# Patient Record
Sex: MALE | Race: WHITE | NOT HISPANIC OR LATINO | Employment: OTHER | URBAN - METROPOLITAN AREA
[De-identification: names, ages, dates, MRNs, and addresses within clinical notes are randomized per-mention and may not be internally consistent; named-entity substitution may affect disease eponyms.]

---

## 2017-01-12 ENCOUNTER — ALLSCRIPTS OFFICE VISIT (OUTPATIENT)
Dept: OTHER | Facility: OTHER | Age: 77
End: 2017-01-12

## 2018-01-12 VITALS
SYSTOLIC BLOOD PRESSURE: 120 MMHG | OXYGEN SATURATION: 98 % | DIASTOLIC BLOOD PRESSURE: 75 MMHG | BODY MASS INDEX: 27.32 KG/M2 | WEIGHT: 185 LBS | TEMPERATURE: 95.7 F | HEART RATE: 60 BPM

## 2018-01-17 NOTE — RESULT NOTES
Verified Results  (1) TISSUE EXAM [id number] 01:24PM Heladio Landry     Test Name Result Flag Reference   LAB AP CASE REPORT (Report)     Surgical Pathology Report             Case: [id number]                   Authorizing Provider: Juan Manuel Unger MD      Collected:      12/13/2016 1324        Ordering Location:   [address] [place] [address]   Received:      12/14/2016 [address]                               Pathologist:      Owen Pinedo MD                               Specimen:  Stomach, Gastric Ulcer - Body   LAB AP FINAL DIAGNOSIS (Report)     Stomach, body, biopsy:   - Mild chronic inactive gastritis with focal surface erosion   - Warthin-Starry stain is negative for H pylori organisms   - Alcian blue stain is negative for intestinal metaplasia   - No dysplasia or malignancy identified    Electronically signed by Owen Pinedo MD on 12/16/2016 at 10:46 AM   LAB AP SURGICAL ADDITIONAL INFORMATION (Report)     These tests were developed and their performance characteristics   determined by [facility] or [facility]  They may not be cleared or approved by the [place]  Food and   Drug Administration  The FDA has determined that such clearance or   approval is not necessary  These tests are used for clinical purposes  They should not be regarded as investigational or for research  This   laboratory has been approved by [place] 88, designated as a high-complexity   laboratory and is qualified to perform these tests  Interpretation performed at [facility]   LAB AP GROSS DESCRIPTION (Report)     A  The specimen is received in formalin, labeled with the patient's name   and hospital number, and is designated gastric ulcer body  The specimen   consists of a single tan soft tissue fragment measuring 0 6 cm  Entirely   submitted  One cassette      Note: The estimated total formalin fixation time based upon information   provided by the submitting clinician and the standard processing schedule   is 24 5 hours      MAC   LAB AP CLINICAL INFORMATION Cold biopsy     Cold biopsy

## 2018-02-20 RX ORDER — LANSOPRAZOLE 30 MG/1
CAPSULE, DELAYED RELEASE ORAL
Qty: 90 CAPSULE | Refills: 0 | OUTPATIENT
Start: 2018-02-20

## 2018-02-21 ENCOUNTER — TELEPHONE (OUTPATIENT)
Dept: GASTROENTEROLOGY | Facility: CLINIC | Age: 78
End: 2018-02-21

## 2018-02-21 DIAGNOSIS — K21.9 GASTROESOPHAGEAL REFLUX DISEASE, ESOPHAGITIS PRESENCE NOT SPECIFIED: Primary | ICD-10-CM

## 2018-02-21 RX ORDER — LANSOPRAZOLE 30 MG/1
30 CAPSULE, DELAYED RELEASE ORAL 2 TIMES DAILY
Qty: 180 CAPSULE | Refills: 2 | Status: SHIPPED | OUTPATIENT
Start: 2018-02-21 | End: 2019-01-14 | Stop reason: SDUPTHER

## 2018-02-21 NOTE — TELEPHONE ENCOUNTER
Dr Mir Grady requesting refill on lansoprazole [facility] mg [facility]--he has office appt 3-28-18 and only has 1 week supply left

## 2018-03-28 ENCOUNTER — OFFICE VISIT (OUTPATIENT)
Dept: GASTROENTEROLOGY | Facility: CLINIC | Age: 78
End: 2018-03-28
Payer: COMMERCIAL

## 2018-03-28 VITALS
HEART RATE: 56 BPM | WEIGHT: 188.2 LBS | BODY MASS INDEX: 27.88 KG/M2 | SYSTOLIC BLOOD PRESSURE: 127 MMHG | TEMPERATURE: 97.1 F | HEIGHT: 69 IN | DIASTOLIC BLOOD PRESSURE: 65 MMHG

## 2018-03-28 DIAGNOSIS — K22.4 ESOPHAGEAL DYSMOTILITY: Primary | ICD-10-CM

## 2018-03-28 PROCEDURE — 99214 OFFICE O/P EST MOD 30 MIN: CPT | Performed by: INTERNAL MEDICINE

## 2018-03-28 NOTE — PROGRESS NOTES
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  Dilation was performed using Savary dilator to 54 Fr  No mucosal tear noted after dialation  He has been doing well since his dilation and has had only three episodes of difficulty swallowing  He is currently taking Lansoprazole but has been getting this filled with his PCP  I recommend he continue this medication at current dosage  Chew your food well  2  GERD -reflux symptoms are well controlled  Continue lansoprazole  3  Colon cancer screening  Per the patient, his last colonoscopy was performed within the past three years, at that time did not have any polyps  I will have our office obtain this record for our review  Follow up in one year    ______________________________________________________________________    SUBJECTIVE:  Phyllis Rodrigues  is a 65 y o  male with history of GERD, and prior esophageal stricture  When we last saw him in November of 2016 he was experiencing dysphagia  He had EGD in December of 2016 which showed normal esophagus  Gastritis found  Superficial ulcer found in the body of the stomach  The dysphagia he was having was 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  Dilation was performed  No mucosal tear noted after dialation  Since his endoscopy he has been doing well  He has had approximately three episodes of difficulty swallowing since his dilation  He has been chewing his food well and taking small bites  He is currently taking Lansoprazole but is needing refill of this medication  His last colonoscopy was performed within the past three years  At that time did not have any polyps  REVIEW OF SYSTEMS IS OTHERWISE NEGATIVE  Historical Information   Past Medical History:   Diagnosis Date    Dysphagia     Irregular heart beat     afib 2012     Past Surgical History:   Procedure Laterality Date    APPENDECTOMY      CARDIOVERSION      JOINT REPLACEMENT      right knee    OR ESOPHAGOGASTRODUODENOSCOPY TRANSORAL DIAGNOSTIC N/A 12/13/2016    Procedure: ESOPHAGOGASTRODUODENOSCOPY (EGD); Surgeon: Dave Reveles MD;  Location:  GI LAB; Service: Gastroenterology     Social History   History   Alcohol Use No     History   Drug Use No     History   Smoking Status    Never Smoker   Smokeless Tobacco    Never Used     History reviewed  No pertinent family history  Meds/Allergies       Current Outpatient Prescriptions:     aspirin 81 MG tablet    Calcium Carbonate-Vitamin D (CALTRATE 600+D PO)    flecainide (TAMBOCOR) 100 mg tablet    lansoprazole (PREVACID) 30 mg capsule    metoprolol succinate (TOPROL-XL) 25 mg 24 hr tablet    Multiple Vitamins-Minerals (OCUVITE PO)    multivitamin (THERAGRAN) TABS    Allergies   Allergen Reactions    Celecoxib Other (See Comments)    Dabigatran     Rivaroxaban     Rofecoxib Other (See Comments)    Sotalol      Other reaction(s): decreased blood pressure           Objective     Blood pressure 127/65, pulse 56, temperature (!) 97 1 °F (36 2 °C), temperature source Tympanic, height 5' 9" (1 753 m), weight 85 4 kg (188 lb 3 2 oz)  PHYSICAL EXAM:      General Appearance:   Alert, cooperative, no distress   HEENT:   Normocephalic, atraumatic, anicteric      Neck:  Supple, symmetrical, trachea midline   Lungs:   Clear to auscultation bilaterally; no rales, rhonchi or wheezing; respirations unlabored    Heart[de-identified]   Regular rate and rhythm; no murmur, rub, or gallop     Abdomen:   Soft, non-tender, non-distended; normal bowel sounds; no masses, no organomegaly    Genitalia:   Deferred    Rectal:   Deferred    Extremities:  No cyanosis, clubbing or edema    Pulses:  2+ and symmetric    Skin:  No jaundice, rashes, or lesions    Lymph nodes:  No palpable cervical lymphadenopathy        Lab Results:   No visits with results within 1 Day(s) from this visit  Latest known visit with results is:   Admission on 12/13/2016, Discharged on 12/13/2016   Component Date Value    Case Report 12/13/2016                      Value:Surgical Pathology Report                         Case: [id number]                                   Authorizing Provider:  Neftali Israel MD           Collected:           12/13/2016 1324              Ordering Location:     [address] [place] [address]      Received:            12/14/2016 [facility]                                                           Pathologist:           Isaac Oconnor MD                                                           Specimen:    Stomach, Gastric Ulcer - Body                                                              Final Diagnosis 12/13/2016                      Value: This result contains rich text formatting which cannot be displayed here   Additional Information 12/13/2016                      Value: This result contains rich text formatting which cannot be displayed here  Smiley Watters Gross Description 12/13/2016                      Value: This result contains rich text formatting which cannot be displayed here   Clinical Information 12/13/2016                      Value:Cold biopsy         Radiology Results:   No results found  Attestation:   By signing my name below, IKeaton, attest that this documentation has been prepared under the direction and in the presence of Neftali Israel MD  Electronically Signed: Fadumo Escobedo  3/28/2018    Neftali GOULD, personally performed the services described in this documentation   All medical record entries made by the scribe were at my direction and in my presence  I have reviewed the chart and discharge instructions and agree that the record reflects my personal performance and is accurate and complete   Giovana Lynn MD   3/28/2018

## 2018-03-28 NOTE — LETTER
March 28, 2018     Garland Flores, [address] [place] [address], [address] [address]    Patient: Cesar Silver  YOB: 1940   Date of Visit: 3/28/2018       Dear Dr Petar Wright: Thank you for referring Carole Yan to me for evaluation  Below are my notes for this consultation  If you have questions, please do not hesitate to call me  I look forward to following your patient along with you           Sincerely,        Nathalia Gore MD        CC: No Recipients

## 2018-03-28 NOTE — LETTER
March 28, 2018     Srinath Vargas, [address] [place] [address] Drive, [address] [place] [address]    Patient: Linda Carr  YOB: 1940   Date of Visit: 3/28/2018       Dear Dr Max Cortez: Thank you for referring Kathy Crawford to me for evaluation  Below are my notes for this consultation  If you have questions, please do not hesitate to call me  I look forward to following your patient along with you  Sincerely,        Kylie Alexander MD        CC: No Recipients  Kylie Alexander MD  3/28/2018 12:21 PM  Sign at close encounter  [facility] - Outpatient Follow-up Note  Linda Carr  65 y o  male MRN: [id number]  Encounter: [id number]          ASSESSMENT AND PLAN:        1  Dysphagia- He has  history of esophageal stricture and difficulty swallowing  He had EGD in December of 2016 which showed normal esophagus  Gastritis found  Superficial ulcer found in the body of the stomach  The dysphagia he was having was 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  Dilation was performed using Qnekt dilator to 54 Fr  No mucosal tear noted after dialation  He has been doing well since his dilation and has had only three episodes of difficulty swallowing  He is currently taking Lansoprazole but has been getting this filled with his PCP  I recommend he continue this medication at current dosage  Chew your food well  2  GERD -reflux symptoms are well controlled  Continue lansoprazole  3  Colon cancer screening  Per the patient, his last colonoscopy was performed within the past three years, at that time did not have any polyps  I will have our office obtain this record for our review  Recommend follow up colonoscopy in seven years  Follow up in one year    ______________________________________________________________________    SUBJECTIVE:  Linda Carr  is a 65 y o  male with history of GERD, and prior esophageal stricture   When we last saw him in November of 2016 he was experiencing dysphagia  He had EGD in December of 2016 which showed normal esophagus  Gastritis found  Superficial ulcer found in the body of the stomach  The dysphagia he was having was 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  Dilation was performed  No mucosal tear noted after dialation  Since his endoscopy he has been doing well  He has had approximately three episodes of difficulty swallowing since his dilation  He has been chewing his food well and taking small bites  He is currently taking Lansoprazole but is needing refill of this medication  His last colonoscopy was performed within the past three years  At that time did not have any polyps  REVIEW OF SYSTEMS IS OTHERWISE NEGATIVE  Historical Information   Past Medical History:   Diagnosis Date    Dysphagia     Irregular heart beat     afib 2012     Past Surgical History:   Procedure Laterality Date    APPENDECTOMY      CARDIOVERSION      JOINT REPLACEMENT      right knee    NC ESOPHAGOGASTRODUODENOSCOPY TRANSORAL DIAGNOSTIC N/A 12/13/2016    Procedure: ESOPHAGOGASTRODUODENOSCOPY (EGD); Surgeon: Edinson Ochoa MD;  Location: [facility]; Service: Gastroenterology     Social History   History   Alcohol Use No     History   Drug Use No     History   Smoking Status    Never Smoker   Smokeless Tobacco    Never Used     History reviewed  No pertinent family history      Meds/Allergies       Current Outpatient Prescriptions:     aspirin 81 MG tablet    Calcium Carbonate-Vitamin D (CALTRATE 600+D PO)    flecainide (TAMBOCOR) 100 mg tablet    lansoprazole (PREVACID) 30 mg capsule    metoprolol succinate (TOPROL-XL) 25 mg 24 hr tablet    Multiple Vitamins-Minerals (OCUVITE PO)    multivitamin (THERAGRAN) TABS    Allergies   Allergen Reactions    Celecoxib Other (See Comments)    Dabigatran     Rivaroxaban     Rofecoxib Other (See Comments)    Sotalol      Other reaction(s): decreased blood pressure           Objective     Blood pressure 127/65, pulse 56, temperature (!) 97 1 °F (36 2 °C), temperature source Tympanic, height 5' 9" (1 753 m), weight 85 4 kg (188 lb 3 2 oz)  PHYSICAL EXAM:      General Appearance:   Alert, cooperative, no distress   HEENT:   Normocephalic, atraumatic, anicteric      Neck:  Supple, symmetrical, trachea midline   Lungs:   Clear to auscultation bilaterally; no rales, rhonchi or wheezing; respirations unlabored    Heart[de-identified]   Regular rate and rhythm; no murmur, rub, or gallop  Abdomen:   Soft, non-tender, non-distended; normal bowel sounds; no masses, no organomegaly    Genitalia:   Deferred    Rectal:   Deferred    Extremities:  No cyanosis, clubbing or edema    Pulses:  2+ and symmetric    Skin:  No jaundice, rashes, or lesions    Lymph nodes:  No palpable cervical lymphadenopathy        Lab Results:   No visits with results within 1 Day(s) from this visit  Latest known visit with results is:   Admission on 12/13/2016, Discharged on 12/13/2016   Component Date Value    Case Report 12/13/2016                      Value:Surgical Pathology Report                         Case: [id number]                                   Authorizing Provider:  Diane Durham MD           Collected:           12/13/2016 1324              Ordering Location:     [facility]      Received:            12/14/2016 [address] [place] [address] Endoscopy                                                           Pathologist:           Norma Gutierres MD                                                           Specimen:    Stomach, Gastric Ulcer - Body                                                              Final Diagnosis 12/13/2016                      Value: This result contains rich text formatting which cannot be displayed here   Additional Information 12/13/2016                      Value: This result contains rich text formatting which cannot be displayed here  aCrson Ley Gross Description 12/13/2016                      Value: This result contains rich text formatting which cannot be displayed here   Clinical Information 12/13/2016                      Value:Cold biopsy         Radiology Results:   No results found  Attestation:   By signing my name below, I, [facility], attest that this documentation has been prepared under the direction and in the presence of Eddie Mascorro MD  Electronically Signed: [facility]  3/28/2018    Eddie GOULD, personally performed the services described in this documentation  All medical record entries made by the scribe were at my direction and in my presence  I have reviewed the chart and discharge instructions and agree that the record reflects my personal performance and is accurate and complete   Eddie Mascorro MD   3/28/2018

## 2019-01-14 ENCOUNTER — TELEPHONE (OUTPATIENT)
Dept: GASTROENTEROLOGY | Facility: CLINIC | Age: 79
End: 2019-01-14

## 2019-01-14 DIAGNOSIS — K21.9 GASTROESOPHAGEAL REFLUX DISEASE, ESOPHAGITIS PRESENCE NOT SPECIFIED: ICD-10-CM

## 2019-01-14 RX ORDER — LANSOPRAZOLE 30 MG/1
30 CAPSULE, DELAYED RELEASE ORAL 2 TIMES DAILY
Qty: 180 CAPSULE | Refills: 0 | Status: SHIPPED | OUTPATIENT
Start: 2019-01-14 | End: 2020-08-26 | Stop reason: SDUPTHER

## 2020-04-01 ENCOUNTER — TELEPHONE (OUTPATIENT)
Dept: FAMILY MEDICINE CLINIC | Facility: CLINIC | Age: 80
End: 2020-04-01

## 2020-04-02 ENCOUNTER — TELEPHONE (OUTPATIENT)
Dept: FAMILY MEDICINE CLINIC | Facility: CLINIC | Age: 80
End: 2020-04-02

## 2020-04-02 DIAGNOSIS — R53.83 FATIGUE, UNSPECIFIED TYPE: Primary | ICD-10-CM

## 2020-04-02 DIAGNOSIS — M79.10 MYALGIA: ICD-10-CM

## 2020-04-05 DIAGNOSIS — R53.83 OTHER FATIGUE: Primary | ICD-10-CM

## 2020-04-05 LAB
ALBUMIN SERPL-MCNC: 3.8 G/DL (ref 3.6–5.1)
ALBUMIN/GLOB SERPL: 2.1 (CALC) (ref 1–2.5)
ALP SERPL-CCNC: 83 U/L (ref 35–144)
ALT SERPL-CCNC: 43 U/L (ref 9–46)
AST SERPL-CCNC: 45 U/L (ref 10–35)
BASOPHILS # BLD AUTO: 29 CELLS/UL (ref 0–200)
BASOPHILS NFR BLD AUTO: 0.7 %
BILIRUB SERPL-MCNC: 0.7 MG/DL (ref 0.2–1.2)
BUN SERPL-MCNC: 14 MG/DL (ref 7–25)
BUN/CREAT SERPL: ABNORMAL (CALC) (ref 6–22)
CALCIUM SERPL-MCNC: 8.5 MG/DL (ref 8.6–10.3)
CHLORIDE SERPL-SCNC: 102 MMOL/L (ref 98–110)
CK MB CFR SERPL ELPH: 1.1 NG/ML (ref 0–5)
CK SERPL-CCNC: 52 U/L (ref 44–196)
CO2 SERPL-SCNC: 28 MMOL/L (ref 20–32)
CREAT SERPL-MCNC: 1.02 MG/DL (ref 0.7–1.11)
EOSINOPHIL # BLD AUTO: 21 CELLS/UL (ref 15–500)
EOSINOPHIL NFR BLD AUTO: 0.5 %
ERYTHROCYTE [DISTWIDTH] IN BLOOD BY AUTOMATED COUNT: 13.7 % (ref 11–15)
ERYTHROCYTE [SEDIMENTATION RATE] IN BLOOD BY WESTERGREN METHOD: 2 MM/H
GLOBULIN SER CALC-MCNC: 1.8 G/DL (CALC) (ref 1.9–3.7)
GLUCOSE SERPL-MCNC: 102 MG/DL (ref 65–99)
HCT VFR BLD AUTO: 49.8 % (ref 38.5–50)
HGB BLD-MCNC: 17.1 G/DL (ref 13.2–17.1)
LYMPHOCYTES # BLD AUTO: 848 CELLS/UL (ref 850–3900)
LYMPHOCYTES NFR BLD AUTO: 20.2 %
MCH RBC QN AUTO: 30 PG (ref 27–33)
MCHC RBC AUTO-ENTMCNC: 34.3 G/DL (ref 32–36)
MCV RBC AUTO: 87.4 FL (ref 80–100)
MONOCYTES # BLD AUTO: 374 CELLS/UL (ref 200–950)
MONOCYTES NFR BLD AUTO: 8.9 %
NEUTROPHILS # BLD AUTO: 2927 CELLS/UL (ref 1500–7800)
NEUTROPHILS NFR BLD AUTO: 69.7 %
PLATELET # BLD AUTO: 172 THOUSAND/UL (ref 140–400)
PMV BLD REES-ECKER: 11.9 FL (ref 7.5–12.5)
POTASSIUM SERPL-SCNC: 4.1 MMOL/L (ref 3.5–5.3)
PROT SERPL-MCNC: 5.6 G/DL (ref 6.1–8.1)
RBC # BLD AUTO: 5.7 MILLION/UL (ref 4.2–5.8)
SL AMB EGFR AFRICAN AMERICAN: 80 ML/MIN/1.73M2
SL AMB EGFR NON AFRICAN AMERICAN: 69 ML/MIN/1.73M2
SODIUM SERPL-SCNC: 138 MMOL/L (ref 135–146)
WBC # BLD AUTO: 4.2 THOUSAND/UL (ref 3.8–10.8)

## 2020-04-05 RX ORDER — LEVOFLOXACIN 500 MG/1
500 TABLET, FILM COATED ORAL EVERY 24 HOURS
Qty: 7 TABLET | Refills: 0 | Status: SHIPPED | OUTPATIENT
Start: 2020-04-05 | End: 2020-04-12

## 2020-04-06 ENCOUNTER — TELEPHONE (OUTPATIENT)
Dept: FAMILY MEDICINE CLINIC | Facility: CLINIC | Age: 80
End: 2020-04-06

## 2020-04-06 DIAGNOSIS — Z20.828 EXPOSURE TO SARS-ASSOCIATED CORONAVIRUS: Primary | ICD-10-CM

## 2020-04-06 PROCEDURE — 87635 SARS-COV-2 COVID-19 AMP PRB: CPT | Performed by: FAMILY MEDICINE

## 2020-04-07 ENCOUNTER — TELEPHONE (OUTPATIENT)
Dept: FAMILY MEDICINE CLINIC | Facility: CLINIC | Age: 80
End: 2020-04-07

## 2020-04-07 ENCOUNTER — TELEMEDICINE (OUTPATIENT)
Dept: FAMILY MEDICINE CLINIC | Facility: CLINIC | Age: 80
End: 2020-04-07
Payer: COMMERCIAL

## 2020-04-07 VITALS — HEIGHT: 69 IN | WEIGHT: 180 LBS | BODY MASS INDEX: 26.66 KG/M2

## 2020-04-07 DIAGNOSIS — U07.1 COVID-19 VIRUS INFECTION: Primary | ICD-10-CM

## 2020-04-07 PROBLEM — I48.91 ATRIAL FIBRILLATION (HCC): Status: ACTIVE | Noted: 2020-04-07

## 2020-04-07 PROBLEM — I42.0 DILATED CARDIOMYOPATHY (HCC): Status: ACTIVE | Noted: 2020-04-07

## 2020-04-07 LAB — SARS-COV-2 RNA SPEC QL NAA+PROBE: DETECTED

## 2020-04-07 PROCEDURE — G2012 BRIEF CHECK IN BY MD/QHP: HCPCS | Performed by: FAMILY MEDICINE

## 2020-08-26 DIAGNOSIS — K21.9 GASTROESOPHAGEAL REFLUX DISEASE, ESOPHAGITIS PRESENCE NOT SPECIFIED: ICD-10-CM

## 2020-08-26 RX ORDER — LANSOPRAZOLE 30 MG/1
30 CAPSULE, DELAYED RELEASE ORAL DAILY
Qty: 90 CAPSULE | Refills: 1 | Status: SHIPPED | OUTPATIENT
Start: 2020-08-26 | End: 2021-04-07 | Stop reason: SDUPTHER

## 2020-08-26 NOTE — TELEPHONE ENCOUNTER
Needs a refill for:  Lansoprazole 30mg, 1 tablet 2x's a day; (90 day supply)    [address]    Patient ph # [phone]

## 2020-11-30 ENCOUNTER — TELEPHONE (OUTPATIENT)
Dept: FAMILY MEDICINE CLINIC | Facility: CLINIC | Age: 80
End: 2020-11-30

## 2021-01-05 ENCOUNTER — OFFICE VISIT (OUTPATIENT)
Dept: INTERNAL MEDICINE CLINIC | Facility: CLINIC | Age: 81
End: 2021-01-05
Payer: COMMERCIAL

## 2021-01-05 VITALS
BODY MASS INDEX: 26.39 KG/M2 | OXYGEN SATURATION: 98 % | HEIGHT: 69 IN | TEMPERATURE: 97.8 F | DIASTOLIC BLOOD PRESSURE: 72 MMHG | SYSTOLIC BLOOD PRESSURE: 138 MMHG | HEART RATE: 59 BPM | WEIGHT: 178.2 LBS

## 2021-01-05 DIAGNOSIS — I48.0 PAROXYSMAL ATRIAL FIBRILLATION (HCC): Primary | ICD-10-CM

## 2021-01-05 DIAGNOSIS — I42.0 DILATED CARDIOMYOPATHY (HCC): ICD-10-CM

## 2021-01-05 DIAGNOSIS — Z00.00 MEDICARE ANNUAL WELLNESS VISIT, SUBSEQUENT: ICD-10-CM

## 2021-01-05 DIAGNOSIS — Z12.5 SCREENING PSA (PROSTATE SPECIFIC ANTIGEN): ICD-10-CM

## 2021-01-05 DIAGNOSIS — I34.0 NONRHEUMATIC MITRAL VALVE REGURGITATION: ICD-10-CM

## 2021-01-05 DIAGNOSIS — K21.9 GASTROESOPHAGEAL REFLUX DISEASE, UNSPECIFIED WHETHER ESOPHAGITIS PRESENT: ICD-10-CM

## 2021-01-05 PROBLEM — U07.1 COVID-19 VIRUS INFECTION: Status: RESOLVED | Noted: 2020-04-07 | Resolved: 2021-01-05

## 2021-01-05 PROCEDURE — 99204 OFFICE O/P NEW MOD 45 MIN: CPT | Performed by: INTERNAL MEDICINE

## 2021-01-05 NOTE — PROGRESS NOTES
Assessment/Plan:    Gastroesophageal reflux disease  H/o esophageal stricture and dilatation  Cont Prevacid    Atrial fibrillation (HCC)  F/U with cardiology as scheduled  On metoprolol flecainide Eliquis    Dilated cardiomyopathy ([facility] [address] )  Euvolemic, no diuretics  EF 55-60%    Nonrheumatic mitral valve regurgitation  Surgery to be planned         Problem List Items Addressed This Visit        Digestive    Gastroesophageal reflux disease     H/o esophageal stricture and dilatation  Cont Prevacid            Cardiovascular and Mediastinum    Nonrheumatic mitral valve regurgitation     Surgery to be planned         Atrial fibrillation ([facility] [address] ) - Primary     F/U with cardiology as scheduled  On metoprolol flecainide Eliquis         Relevant Orders    CBC and differential    Comprehensive metabolic panel    Lipid Panel with Direct LDL reflex    Dilated cardiomyopathy ([facility] [address] )     Euvolemic, no diuretics  EF 55-60%         Relevant Orders    CBC and differential    Comprehensive metabolic panel    Lipid Panel with Direct LDL reflex      Other Visit Diagnoses     Screening PSA (prostate specific antigen)        Relevant Orders    PSA, Total Screen    Medicare annual wellness visit, subsequent                Subjective:      Patient ID: Chanda Frey  is a 79 y o  male      HPI  Here to establish care  Daughter comes to this practice  Afib in  and was started on flecainide then  In Sept, he had rapid afib and was cardioverted and placed on Eliquis  Also found with severe MR but no surgery planned a this time  Another episode lat week and cardioverted again  Wife  in July for advanced ovarian cancer    The following portions of the patient's history were reviewed and updated as appropriate: allergies, current medications, past family history, past medical history, past social history, past surgical history and problem list     Review of Systems   Constitutional: Negative for chills, fatigue, fever and unexpected weight change  HENT: Negative for congestion, rhinorrhea and sore throat  Respiratory: Negative for cough, shortness of breath and wheezing  Cardiovascular: Negative for chest pain, palpitations and leg swelling  Gastrointestinal: Negative for abdominal pain, constipation, diarrhea, nausea and vomiting  Genitourinary: Negative for difficulty urinating  Musculoskeletal: Negative for arthralgias and myalgias  Neurological: Negative for dizziness and headaches  Psychiatric/Behavioral: Positive for dysphoric mood  Objective:      /72   Pulse 59   Temp 97 8 °F (36 6 °C)   Ht 5' 9" (1 753 m)   Wt 80 8 kg (178 lb 3 2 oz)   SpO2 98%   BMI 26 32 kg/m²          Physical Exam  Constitutional:       General: He is not in acute distress  Appearance: Normal appearance  He is well-developed  He is not ill-appearing, toxic-appearing or diaphoretic  HENT:      Head: Normocephalic and atraumatic  Right Ear: External ear normal  There is no impacted cerumen  Left Ear: External ear normal  There is no impacted cerumen  Eyes:      Conjunctiva/sclera: Conjunctivae normal    Neck:      Musculoskeletal: Neck supple  Cardiovascular:      Rate and Rhythm: Normal rate and regular rhythm  Heart sounds: Murmur (apical) present  Systolic murmur present with a grade of 2/6  Pulmonary:      Effort: Pulmonary effort is normal  No respiratory distress  Breath sounds: Normal breath sounds  No wheezing or rales  Abdominal:      General: There is no distension  Palpations: Abdomen is soft  There is no mass  Tenderness: There is no abdominal tenderness  There is no guarding or rebound  Musculoskeletal: Normal range of motion  Right lower leg: No edema  Left lower leg: No edema  Skin:     General: Skin is warm and dry  Neurological:      Mental Status: He is alert and oriented to person, place, and time     Psychiatric:         Mood and Affect: Mood normal  Behavior: Behavior normal          Thought Content:  Thought content normal          Judgment: Judgment normal

## 2021-01-06 LAB — PSA SERPL-MCNC: 0.7 NG/ML

## 2021-01-14 ENCOUNTER — IMMUNIZATIONS (OUTPATIENT)
Dept: FAMILY MEDICINE CLINIC | Facility: HOSPITAL | Age: 81
End: 2021-01-14

## 2021-01-14 DIAGNOSIS — Z23 ENCOUNTER FOR IMMUNIZATION: Primary | ICD-10-CM

## 2021-01-14 PROCEDURE — 0001A SARS-COV-2 / COVID-19 MRNA VACCINE (PFIZER-BIONTECH) 30 MCG: CPT

## 2021-01-14 PROCEDURE — 91300 SARS-COV-2 / COVID-19 MRNA VACCINE (PFIZER-BIONTECH) 30 MCG: CPT

## 2021-02-03 ENCOUNTER — IMMUNIZATIONS (OUTPATIENT)
Dept: FAMILY MEDICINE CLINIC | Facility: HOSPITAL | Age: 81
End: 2021-02-03

## 2021-02-03 DIAGNOSIS — Z23 ENCOUNTER FOR IMMUNIZATION: Primary | ICD-10-CM

## 2021-02-03 PROCEDURE — 0002A SARS-COV-2 / COVID-19 MRNA VACCINE (PFIZER-BIONTECH) 30 MCG: CPT

## 2021-02-03 PROCEDURE — 91300 SARS-COV-2 / COVID-19 MRNA VACCINE (PFIZER-BIONTECH) 30 MCG: CPT

## 2021-03-23 DIAGNOSIS — R33.9 URINARY RETENTION WITH INCOMPLETE BLADDER EMPTYING: Primary | ICD-10-CM

## 2021-03-24 RX ORDER — TAMSULOSIN HYDROCHLORIDE 0.4 MG/1
0.4 CAPSULE ORAL
Qty: 30 CAPSULE | Refills: 4 | Status: SHIPPED | OUTPATIENT
Start: 2021-03-24 | End: 2021-07-13 | Stop reason: ALTCHOICE

## 2021-03-24 NOTE — TELEPHONE ENCOUNTER
Please call patient to confirm if he is taking tamsulosin or Flomax   Refill request received but it is not on his medication list

## 2021-04-07 DIAGNOSIS — K21.9 GASTROESOPHAGEAL REFLUX DISEASE: ICD-10-CM

## 2021-04-07 RX ORDER — LANSOPRAZOLE 30 MG/1
30 CAPSULE, DELAYED RELEASE ORAL DAILY
Qty: 90 CAPSULE | Refills: 1 | Status: SHIPPED | OUTPATIENT
Start: 2021-04-07 | End: 2021-07-13 | Stop reason: SDUPTHER

## 2021-06-14 ENCOUNTER — TELEPHONE (OUTPATIENT)
Dept: INTERNAL MEDICINE CLINIC | Facility: CLINIC | Age: 81
End: 2021-06-14

## 2021-06-14 DIAGNOSIS — I48.0 PAROXYSMAL ATRIAL FIBRILLATION (HCC): ICD-10-CM

## 2021-06-14 DIAGNOSIS — E78.5 DYSLIPIDEMIA: ICD-10-CM

## 2021-06-14 DIAGNOSIS — I34.0 NONRHEUMATIC MITRAL VALVE REGURGITATION: Primary | ICD-10-CM

## 2021-06-14 NOTE — TELEPHONE ENCOUNTER
The patient sees you on 7/13/21  He would like to have routine blood work done before then  Please advise  Thank you  Please mail orders to the patient  Thank you

## 2021-07-01 LAB
ALBUMIN SERPL-MCNC: 4 G/DL (ref 3.6–5.1)
ALBUMIN/GLOB SERPL: 2.2 (CALC) (ref 1–2.5)
ALP SERPL-CCNC: 58 U/L (ref 35–144)
ALT SERPL-CCNC: 36 U/L (ref 9–46)
AST SERPL-CCNC: 24 U/L (ref 10–35)
BASOPHILS # BLD AUTO: 92 CELLS/UL (ref 0–200)
BASOPHILS NFR BLD AUTO: 1.5 %
BILIRUB SERPL-MCNC: 1 MG/DL (ref 0.2–1.2)
BUN SERPL-MCNC: 21 MG/DL (ref 7–25)
BUN/CREAT SERPL: ABNORMAL (CALC) (ref 6–22)
CALCIUM SERPL-MCNC: 9.4 MG/DL (ref 8.6–10.3)
CHLORIDE SERPL-SCNC: 107 MMOL/L (ref 98–110)
CHOLEST SERPL-MCNC: 198 MG/DL
CHOLEST/HDLC SERPL: 4.3 (CALC)
CO2 SERPL-SCNC: 29 MMOL/L (ref 20–32)
CREAT SERPL-MCNC: 1.07 MG/DL (ref 0.7–1.11)
EOSINOPHIL # BLD AUTO: 244 CELLS/UL (ref 15–500)
EOSINOPHIL NFR BLD AUTO: 4 %
ERYTHROCYTE [DISTWIDTH] IN BLOOD BY AUTOMATED COUNT: 13.5 % (ref 11–15)
GLOBULIN SER CALC-MCNC: 1.8 G/DL (CALC) (ref 1.9–3.7)
GLUCOSE SERPL-MCNC: 103 MG/DL (ref 65–99)
HCT VFR BLD AUTO: 45.2 % (ref 38.5–50)
HDLC SERPL-MCNC: 46 MG/DL
HGB BLD-MCNC: 15.2 G/DL (ref 13.2–17.1)
LDLC SERPL CALC-MCNC: 135 MG/DL (CALC)
LYMPHOCYTES # BLD AUTO: 1452 CELLS/UL (ref 850–3900)
LYMPHOCYTES NFR BLD AUTO: 23.8 %
MCH RBC QN AUTO: 30.2 PG (ref 27–33)
MCHC RBC AUTO-ENTMCNC: 33.6 G/DL (ref 32–36)
MCV RBC AUTO: 89.9 FL (ref 80–100)
MONOCYTES # BLD AUTO: 512 CELLS/UL (ref 200–950)
MONOCYTES NFR BLD AUTO: 8.4 %
NEUTROPHILS # BLD AUTO: 3800 CELLS/UL (ref 1500–7800)
NEUTROPHILS NFR BLD AUTO: 62.3 %
NONHDLC SERPL-MCNC: 152 MG/DL (CALC)
PLATELET # BLD AUTO: 230 THOUSAND/UL (ref 140–400)
PMV BLD REES-ECKER: 11.1 FL (ref 7.5–12.5)
POTASSIUM SERPL-SCNC: 4.2 MMOL/L (ref 3.5–5.3)
PROT SERPL-MCNC: 5.8 G/DL (ref 6.1–8.1)
RBC # BLD AUTO: 5.03 MILLION/UL (ref 4.2–5.8)
SL AMB EGFR AFRICAN AMERICAN: 75 ML/MIN/1.73M2
SL AMB EGFR NON AFRICAN AMERICAN: 65 ML/MIN/1.73M2
SODIUM SERPL-SCNC: 142 MMOL/L (ref 135–146)
TRIGL SERPL-MCNC: 77 MG/DL
WBC # BLD AUTO: 6.1 THOUSAND/UL (ref 3.8–10.8)

## 2021-07-08 RX ORDER — AMIODARONE HYDROCHLORIDE 200 MG/1
200 TABLET ORAL DAILY
COMMUNITY
Start: 2021-03-24

## 2021-07-08 RX ORDER — VALSARTAN 40 MG/1
40 TABLET ORAL DAILY
COMMUNITY
Start: 2021-06-07

## 2021-07-08 RX ORDER — ASPIRIN 81 MG/1
81 TABLET ORAL DAILY
COMMUNITY
Start: 2021-03-08 | End: 2022-03-08

## 2021-07-13 ENCOUNTER — OFFICE VISIT (OUTPATIENT)
Dept: INTERNAL MEDICINE CLINIC | Facility: CLINIC | Age: 81
End: 2021-07-13
Payer: COMMERCIAL

## 2021-07-13 VITALS
OXYGEN SATURATION: 98 % | BODY MASS INDEX: 27.4 KG/M2 | HEART RATE: 65 BPM | WEIGHT: 185 LBS | HEIGHT: 69 IN | DIASTOLIC BLOOD PRESSURE: 70 MMHG | SYSTOLIC BLOOD PRESSURE: 125 MMHG

## 2021-07-13 DIAGNOSIS — I42.0 DILATED CARDIOMYOPATHY (HCC): ICD-10-CM

## 2021-07-13 DIAGNOSIS — E78.2 MIXED HYPERLIPIDEMIA: ICD-10-CM

## 2021-07-13 DIAGNOSIS — I10 HYPERTENSION, ESSENTIAL, BENIGN: ICD-10-CM

## 2021-07-13 DIAGNOSIS — K21.9 GASTROESOPHAGEAL REFLUX DISEASE, UNSPECIFIED WHETHER ESOPHAGITIS PRESENT: ICD-10-CM

## 2021-07-13 DIAGNOSIS — I34.0 NONRHEUMATIC MITRAL VALVE REGURGITATION: Primary | ICD-10-CM

## 2021-07-13 DIAGNOSIS — Z00.00 MEDICARE ANNUAL WELLNESS VISIT, SUBSEQUENT: ICD-10-CM

## 2021-07-13 DIAGNOSIS — I48.0 PAROXYSMAL ATRIAL FIBRILLATION (HCC): ICD-10-CM

## 2021-07-13 PROCEDURE — G0439 PPPS, SUBSEQ VISIT: HCPCS | Performed by: INTERNAL MEDICINE

## 2021-07-13 PROCEDURE — 99214 OFFICE O/P EST MOD 30 MIN: CPT | Performed by: INTERNAL MEDICINE

## 2021-07-13 RX ORDER — LANSOPRAZOLE 30 MG/1
30 CAPSULE, DELAYED RELEASE ORAL DAILY
Qty: 90 CAPSULE | Refills: 3 | Status: SHIPPED | OUTPATIENT
Start: 2021-07-13 | End: 2022-01-13 | Stop reason: SDUPTHER

## 2021-07-13 NOTE — PROGRESS NOTES
Assessment/Plan:    Gastroesophageal reflux disease  Controlled on lansoprazole    Atrial fibrillation (HCC)  On amiodarone and Eliquis    Nonrheumatic mitral valve regurgitation  Mitraclip on 3/4    Dilated cardiomyopathy (HCC)  No symptoms of CHF    Hypertension, essential, benign  Controlled on valsartan         Problem List Items Addressed This Visit        Digestive    Gastroesophageal reflux disease     Controlled on lansoprazole         Relevant Medications    lansoprazole (PREVACID) 30 mg capsule       Cardiovascular and Mediastinum    Atrial fibrillation (HCC)     On amiodarone and Eliquis         Dilated cardiomyopathy (HCC)     No symptoms of CHF         Nonrheumatic mitral valve regurgitation - Primary     Mitraclip on 3/4         Hypertension, essential, benign     Controlled on valsartan         Relevant Medications    valsartan (DIOVAN) 40 mg tablet      Other Visit Diagnoses     Medicare annual wellness visit, subsequent        Mixed hyperlipidemia        Relevant Orders    Comprehensive metabolic panel    Lipid Panel with Direct LDL reflex            Subjective:      Patient ID: Joseph Anton  is a 79 y o  male  HPI  New patient back in January  S/p Mitraclip transscatheter mitral valve repair 3/4 for severe MR  Urinary retention post op but refused to be discharged with a Zhu  He took Flomax  Up to 0 8mg and this caused dizziness  He was able to void prior to discharge  This was decreased to 0 4mg and eventually stopped  Wife  in 2020 from metastatic ovarian cancer   He continues to grieve  Stays active  Family very supportive  Recent labs reviewed-   ;  slightly low total protein and globulin ; normal CBCD    The following portions of the patient's history were reviewed and updated as appropriate: allergies, current medications, past family history, past medical history, past social history, past surgical history and problem list     Review of Systems Constitutional: Negative for chills, fatigue and fever  HENT: Negative for congestion and rhinorrhea  Respiratory: Negative for cough, shortness of breath and wheezing  Cardiovascular: Positive for leg swelling (since 2015 right knee replacement)  Negative for chest pain and palpitations  Gastrointestinal: Negative for abdominal pain, constipation, diarrhea, nausea and vomiting  Genitourinary: Negative for difficulty urinating  Neurological: Negative for dizziness and headaches  Objective:      /70   Pulse 65   Ht 5' 9" (1 753 m)   Wt 83 9 kg (185 lb)   SpO2 98%   BMI 27 32 kg/m²          Physical Exam  Constitutional:       General: He is not in acute distress  Appearance: He is well-developed  He is not ill-appearing, toxic-appearing or diaphoretic  HENT:      Head: Normocephalic and atraumatic  Eyes:      Conjunctiva/sclera: Conjunctivae normal    Cardiovascular:      Rate and Rhythm: Normal rate and regular rhythm  Heart sounds: Normal heart sounds  No murmur heard  Pulmonary:      Effort: Pulmonary effort is normal  No respiratory distress  Breath sounds: Normal breath sounds  No wheezing or rales  Abdominal:      General: There is no distension  Palpations: Abdomen is soft  There is no mass  Tenderness: There is no abdominal tenderness  There is no guarding or rebound  Musculoskeletal:      Cervical back: Neck supple  Skin:     General: Skin is warm and dry  Neurological:      Mental Status: He is alert and oriented to person, place, and time  Psychiatric:         Mood and Affect: Mood normal          Behavior: Behavior normal          Thought Content:  Thought content normal          Judgment: Judgment normal

## 2021-07-13 NOTE — PATIENT INSTRUCTIONS
Cut out red meat in the diet    Medicare Preventive Visit Patient Instructions  Thank you for completing your Welcome to Medicare Visit or Medicare Annual Wellness Visit today  Your next wellness visit will be due in one year (7/14/2022)  The screening/preventive services that you may require over the next 5-10 years are detailed below  Some tests may not apply to you based off risk factors and/or age  Screening tests ordered at today's visit but not completed yet may show as past due  Also, please note that scanned in results may not display below  Preventive Screenings:  Service Recommendations Previous Testing/Comments   Colorectal Cancer Screening  · Colonoscopy    · Fecal Occult Blood Test (FOBT)/Fecal Immunochemical Test (FIT)  · Fecal DNA/Cologuard Test  · Flexible Sigmoidoscopy Age: 54-65 years old   Colonoscopy: every 10 years (May be performed more frequently if at higher risk)  OR  FOBT/FIT: every 1 year  OR  Cologuard: every 3 years  OR  Sigmoidoscopy: every 5 years  Screening may be recommended earlier than age 48 if at higher risk for colorectal cancer  Also, an individualized decision between you and your healthcare provider will decide whether screening between the ages of 74-80 would be appropriate   Colonoscopy: 01/31/2012  FOBT/FIT: Not on file  Cologuard: Not on file  Sigmoidoscopy: Not on file          Prostate Cancer Screening Individualized decision between patient and health care provider in men between ages of 53-78   Medicare will cover every 12 months beginning on the day after your 50th birthday PSA: 0 7 ng/mL     Screening Not Indicated     Hepatitis C Screening Once for adults born between 1945 and 1965  More frequently in patients at high risk for Hepatitis C Hep C Antibody: Not on file        Diabetes Screening 1-2 times per year if you're at risk for diabetes or have pre-diabetes Fasting glucose: No results in last 5 years   A1C: No results in last 5 years    Screening Current Cholesterol Screening Once every 5 years if you don't have a lipid disorder  May order more often based on risk factors  Lipid panel: 07/01/2021    Screening Current      Other Preventive Screenings Covered by Medicare:  1  Abdominal Aortic Aneurysm (AAA) Screening: covered once if your at risk  You're considered to be at risk if you have a family history of AAA or a male between the age of 73-68 who smoking at least 100 cigarettes in your lifetime  2  Lung Cancer Screening: covers low dose CT scan once per year if you meet all of the following conditions: (1) Age 50-69; (2) No signs or symptoms of lung cancer; (3) Current smoker or have quit smoking within the last 15 years; (4) You have a tobacco smoking history of at least 30 pack years (packs per day x number of years you smoked); (5) You get a written order from a healthcare provider  3  Glaucoma Screening: covered annually if you're considered high risk: (1) You have diabetes OR (2) Family history of glaucoma OR (3)  aged 48 and older OR (3)  American aged 72 and older  3  Osteoporosis Screening: covered every 2 years if you meet one of the following conditions: (1) Have a vertebral abnormality; (2) On glucocorticoid therapy for more than 3 months; (3) Have primary hyperparathyroidism; (4) On osteoporosis medications and need to assess response to drug therapy  5  HIV Screening: covered annually if you're between the age of 12-76  Also covered annually if you are younger than 13 and older than 72 with risk factors for HIV infection  For pregnant patients, it is covered up to 3 times per pregnancy      Immunizations:  Immunization Recommendations   Influenza Vaccine Annual influenza vaccination during flu season is recommended for all persons aged >= 6 months who do not have contraindications   Pneumococcal Vaccine (Prevnar and Pneumovax)  * Prevnar = PCV13  * Pneumovax = PPSV23 Adults 25-60 years old: 1-3 doses may be recommended based on certain risk factors  Adults 72 years old: Prevnar (PCV13) vaccine recommended followed by Pneumovax (PPSV23) vaccine  If already received PPSV23 since turning 65, then PCV13 recommended at least one year after PPSV23 dose  Hepatitis B Vaccine 3 dose series if at intermediate or high risk (ex: diabetes, end stage renal disease, liver disease)   Tetanus (Td) Vaccine - COST NOT COVERED BY MEDICARE PART B Following completion of primary series, a booster dose should be given every 10 years to maintain immunity against tetanus  Td may also be given as tetanus wound prophylaxis  Tdap Vaccine - COST NOT COVERED BY MEDICARE PART B Recommended at least once for all adults  For pregnant patients, recommended with each pregnancy  Shingles Vaccine (Shingrix) - COST NOT COVERED BY MEDICARE PART B  2 shot series recommended in those aged 48 and above     Health Maintenance Due:      Topic Date Due    Colorectal Cancer Screening  01/31/2019     Immunizations Due:      Topic Date Due    Pneumococcal Vaccine: 65+ Years (1 of 2 - PPSV23) 10/26/2020    Influenza Vaccine (1) 09/01/2021     Advance Directives   What are advance directives? Advance directives are legal documents that state your wishes and plans for medical care  These plans are made ahead of time in case you lose your ability to make decisions for yourself  Advance directives can apply to any medical decision, such as the treatments you want, and if you want to donate organs  What are the types of advance directives? There are many types of advance directives, and each state has rules about how to use them  You may choose a combination of any of the following:  · Living will: This is a written record of the treatment you want  You can also choose which treatments you do not want, which to limit, and which to stop at a certain time  This includes surgery, medicine, IV fluid, and tube feedings     · Durable power of  for healthcare [facility]): This is a written record that states who you want to make healthcare choices for you when you are unable to make them for yourself  This person, called a proxy, is usually a family member or a friend  You may choose more than 1 proxy  · Do not resuscitate (DNR) order:  A DNR order is used in case your heart stops beating or you stop breathing  It is a request not to have certain forms of treatment, such as CPR  A DNR order may be included in other types of advance directives  · Medical directive: This covers the care that you want if you are in a coma, near death, or unable to make decisions for yourself  You can list the treatments you want for each condition  Treatment may include pain medicine, surgery, blood transfusions, dialysis, IV or tube feedings, and a ventilator (breathing machine)  · Values history: This document has questions about your views, beliefs, and how you feel and think about life  This information can help others choose the care that you would choose  Why are advance directives important? An advance directive helps you control your care  Although spoken wishes may be used, it is better to have your wishes written down  Spoken wishes can be misunderstood, or not followed  Treatments may be given even if you do not want them  An advance directive may make it easier for your family to make difficult choices about your care  Weight Management   Why it is important to manage your weight:  Being overweight increases your risk of health conditions such as heart disease, high blood pressure, type 2 diabetes, and certain types of cancer  It can also increase your risk for osteoarthritis, sleep apnea, and other respiratory problems  Aim for a slow, steady weight loss  Even a small amount of weight loss can lower your risk of health problems  How to lose weight safely:  A safe and healthy way to lose weight is to eat fewer calories and get regular exercise   You can lose up about 1 pound a week by decreasing the number of calories you eat by 500 calories each day  Healthy meal plan for weight management:  A healthy meal plan includes a variety of foods, contains fewer calories, and helps you stay healthy  A healthy meal plan includes the following:  · Eat whole-grain foods more often  A healthy meal plan should contain fiber  Fiber is the part of grains, fruits, and vegetables that is not broken down by your body  Whole-grain foods are healthy and provide extra fiber in your diet  Some examples of whole-grain foods are whole-wheat breads and pastas, oatmeal, brown rice, and bulgur  · Eat a variety of vegetables every day  Include dark, leafy greens such as spinach, kale, idania greens, and mustard greens  Eat yellow and orange vegetables such as carrots, sweet potatoes, and winter squash  · Eat a variety of fruits every day  Choose fresh or canned fruit (canned in its own juice or light syrup) instead of juice  Fruit juice has very little or no fiber  · Eat low-fat dairy foods  Drink fat-free (skim) milk or 1% milk  Eat fat-free yogurt and low-fat cottage cheese  Try low-fat cheeses such as mozzarella and other reduced-fat cheeses  · Choose meat and other protein foods that are low in fat  Choose beans or other legumes such as split peas or lentils  Choose fish, skinless poultry (chicken or turkey), or lean cuts of red meat (beef or pork)  Before you cook meat or poultry, cut off any visible fat  · Use less fat and oil  Try baking foods instead of frying them  Add less fat, such as margarine, sour cream, regular salad dressing and mayonnaise to foods  Eat fewer high-fat foods  Some examples of high-fat foods include french fries, doughnuts, ice cream, and cakes  · Eat fewer sweets  Limit foods and drinks that are high in sugar  This includes candy, cookies, regular soda, and sweetened drinks  Exercise:  Exercise at least 30 minutes per day on most days of the week   Some examples of

## 2021-07-13 NOTE — PROGRESS NOTES
Assessment and Plan:     Problem List Items Addressed This Visit        Digestive    Gastroesophageal reflux disease     Controlled on lansoprazole         Relevant Medications    lansoprazole (PREVACID) 30 mg capsule       Cardiovascular and Mediastinum    Atrial fibrillation (HCC)     On amiodarone and Eliquis         Dilated cardiomyopathy (HCC)     No symptoms of CHF         Nonrheumatic mitral valve regurgitation - Primary     Mitraclip on 3/4         Hypertension, essential, benign     Controlled on valsartan         Relevant Medications    valsartan (DIOVAN) 40 mg tablet      Other Visit Diagnoses     Medicare annual wellness visit, subsequent        Mixed hyperlipidemia        Relevant Orders    Comprehensive metabolic panel    Lipid Panel with Direct LDL reflex        BMI Counseling: Body mass index is 27 32 kg/m²  The BMI is above normal  Nutrition recommendations include encouraging healthy choices of fruits and vegetables  Preventive health issues were discussed with patient, and age appropriate screening tests were ordered as noted in patient's After Visit Summary  Personalized health advice and appropriate referrals for health education or preventive services given if needed, as noted in patient's After Visit Summary       History of Present Illness:     Patient presents for Medicare Annual Wellness visit    Patient Care Team:  Gayle Bear MD as PCP - General (Internal Medicine)  Bobbi Bhatia MD as PCP - [address] [place] [address] (RTE)  Rachell Hernandez MD     Problem List:     Patient Active Problem List   Diagnosis    Gastroesophageal reflux disease    Atrial fibrillation ([address] )    Dilated cardiomyopathy [facility] [address] )    Nonrheumatic mitral valve regurgitation    Hypertension, essential, benign      Past Medical and Surgical History:     Past Medical History:   Diagnosis Date    COVID-19 virus infection 4/7/2020    Dysphagia     Irregular heart beat     afib 2012     Past Surgical History: Procedure Laterality Date    APPENDECTOMY      CARDIOVERSION      COLONOSCOPY      HERNIA REPAIR      JOINT REPLACEMENT      right knee    KNEE SURGERY  2015    [place] ESOPHAGOGASTRODUODENOSCOPY TRANSORAL DIAGNOSTIC N/A 2016    Procedure: ESOPHAGOGASTRODUODENOSCOPY (EGD); Surgeon: Aníbal Lees MD;  Location: [facility]; Service: Gastroenterology    WISDOM TOOTH EXTRACTION        Family History:     Family History   Problem Relation Age of Onset    Cancer Mother         Breast    Prostate cancer Father       Social History:     Social History     Socioeconomic History    Marital status: /Civil Union     Spouse name: None    Number of children: None    Years of education: None    Highest education level: None   Occupational History    None   Tobacco Use    Smoking status: Never Smoker    Smokeless tobacco: Never Used    Tobacco comment: Most recent tobacco use screenin2018    Substance and Sexual Activity    Alcohol use: No    Drug use: No    Sexual activity: None   Other Topics Concern    None   Social History Narrative    None     Social Determinants of Health     Financial Resource Strain:     Difficulty of Paying Living Expenses:    Food Insecurity:     Worried About Running Out of Food in the Last Year:     Ran Out of Food in the Last Year:    Transportation Needs:     Lack of Transportation (Medical):      Lack of Transportation (Non-Medical):    Physical Activity:     Days of Exercise per Week:     Minutes of Exercise per Session:    Stress:     Feeling of Stress :    Social Connections:     Frequency of Communication with Friends and Family:     Frequency of Social Gatherings with Friends and Family:     Attends Jain Services:     Active Member of Clubs or Organizations:     Attends Club or Organization Meetings:     Marital Status:    Intimate Partner Violence:     Fear of Current or Ex-Partner:     Emotionally Abused:     Physically Abused:     Sexually Abused:       Medications and Allergies:     Current Outpatient Medications   Medication Sig Dispense Refill    amiodarone 200 mg tablet Take 200 mg by mouth daily      apixaban (ELIQUIS) 5 mg Take 5 mg by mouth 2 (two) times a day      aspirin (ECOTRIN LOW STRENGTH) 81 mg EC tablet Take 81 mg by mouth daily      Calcium Carbonate-Vitamin D (CALTRATE 600+D PO) Take 1 tablet by mouth daily      lansoprazole (PREVACID) 30 mg capsule Take 1 capsule (30 mg total) by mouth daily 90 capsule 3    Multiple Vitamins-Minerals (OCUVITE PO) Take 1 tablet by mouth daily      multivitamin (THERAGRAN) TABS Take 1 tablet by mouth daily      valsartan (DIOVAN) 40 mg tablet Take 40 mg by mouth daily      Zn-Pyg Afri-Nettle-Saw Palmet (SAW PALMETTO COMPLEX PO) Take by mouth       No current facility-administered medications for this visit  Allergies   Allergen Reactions    Celecoxib Other (See Comments)    Dabigatran     Rivaroxaban     Rofecoxib Other (See Comments)    Sotalol      Other reaction(s): decreased blood pressure      Immunizations:     Immunization History   Administered Date(s) Administered    Pneumococcal Conjugate 13-Valent 10/02/2015, 08/31/2020    SARS-CoV-2 / COVID-19 mRNA IM (Pfizer-BioNTech) 01/14/2021, 02/03/2021    Tdap 10/05/2017    Zoster Vaccine Recombinant 02/02/2019, 09/16/2020, 12/01/2020    influenza, trivalent, adjuvanted 09/25/2019      Health Maintenance:         Topic Date Due    Colorectal Cancer Screening  01/31/2019         Topic Date Due    Pneumococcal Vaccine: 65+ Years (1 of 2 - PPSV23) 10/26/2020    Influenza Vaccine (1) 09/01/2021      Medicare Health Risk Assessment:     /70   Pulse 65   Ht 5' 9" (1 753 m)   Wt 83 9 kg (185 lb)   SpO2 98%   BMI 27 32 kg/m²      Kenna Davey is here for his Subsequent Wellness visit  Health Risk Assessment:   Patient rates overall health as excellent   Patient feels that their physical health rating is same  Patient is very satisfied with their life  Eyesight was rated as same  Hearing was rated as same  Patient feels that their emotional and mental health rating is slightly better  Patients states they are never, rarely angry  Patient states they are sometimes unusually tired/fatigued  Pain experienced in the last 7 days has been none  Patient states that he has experienced no weight loss or gain in last 6 months  Depression Screening:   PHQ-2 Score: 0      Fall Risk Screening: In the past year, patient has experienced: no history of falling in past year      Home Safety:  Patient does not have trouble with stairs inside or outside of their home  Patient has working smoke alarms and has working carbon monoxide detector  Home safety hazards include: not having non-slip bath and/or shower mats  Nutrition:   Current diet is Regular  Medications:   Patient is not currently taking any over-the-counter supplements  Patient is able to manage medications  Activities of Daily Living (ADLs)/Instrumental Activities of Daily Living (IADLs):   Walk and transfer into and out of bed and chair?: Yes  Dress and groom yourself?: Yes    Bathe or shower yourself?: Yes    Feed yourself? Yes  Do your laundry/housekeeping?: Yes  Manage your money, pay your bills and track your expenses?: Yes  Make your own meals?: Yes    Do your own shopping?: Yes    Durable Medical Equipment Suppliers  none    Previous Hospitalizations:   Any hospitalizations or ED visits within the last 12 months?: Yes    How many hospitalizations have you had in the last year?: 1-2    Advance Care Planning:   Living will: Yes    Durable POA for healthcare:  Yes    Advanced directive: Yes      Cognitive Screening:   Provider or family/friend/caregiver concerned regarding cognition?: No    PREVENTIVE SCREENINGS      Cardiovascular Screening:    General: Screening Current      Diabetes Screening:     General: Screening Current      Prostate Cancer Screening:    General: Screening Not Indicated      Osteoporosis Screening:    General: Screening Not Indicated      Abdominal Aortic Aneurysm (AAA) Screening:        General: Screening Not Indicated      Lung Cancer Screening:     General: Screening Not Indicated      Hepatitis C Screening:    General: Screening Not Indicated    Screening, Brief Intervention, and Referral to Treatment (SBIRT)    Screening      AUDIT-C Screenin) How often did you have a drink containing alcohol in the past year? monthly or less  2) How many drinks did you have on a typical day when you were drinking in the past year?  1 to 2  3) How often did you have 6 or more drinks on one occasion in the past year? never    AUDIT-C Score: 1  Interpretation: Score 0-3 (male): Negative screen for alcohol misuse    Single Item Drug Screening:  How often have you used an illegal drug (including marijuana) or a prescription medication for non-medical reasons in the past year? never    Single Item Drug Screen Score: 0  Interpretation: Negative screen for possible drug use disorder      Melchor Bloch, MD

## 2021-07-17 PROBLEM — I10 HYPERTENSION, ESSENTIAL, BENIGN: Status: ACTIVE | Noted: 2021-07-17

## 2021-08-20 ENCOUNTER — OFFICE VISIT (OUTPATIENT)
Dept: INTERNAL MEDICINE CLINIC | Facility: CLINIC | Age: 81
End: 2021-08-20
Payer: COMMERCIAL

## 2021-08-20 VITALS
WEIGHT: 186 LBS | HEIGHT: 69 IN | HEART RATE: 79 BPM | OXYGEN SATURATION: 97 % | DIASTOLIC BLOOD PRESSURE: 62 MMHG | BODY MASS INDEX: 27.55 KG/M2 | SYSTOLIC BLOOD PRESSURE: 124 MMHG

## 2021-08-20 DIAGNOSIS — Z23 ENCOUNTER FOR IMMUNIZATION: ICD-10-CM

## 2021-08-20 DIAGNOSIS — L03.115 CELLULITIS OF RIGHT LEG: Primary | ICD-10-CM

## 2021-08-20 PROCEDURE — 99213 OFFICE O/P EST LOW 20 MIN: CPT | Performed by: NURSE PRACTITIONER

## 2021-08-20 RX ORDER — CEPHALEXIN 500 MG/1
500 CAPSULE ORAL EVERY 6 HOURS SCHEDULED
Qty: 28 CAPSULE | Refills: 0 | Status: SHIPPED | OUTPATIENT
Start: 2021-08-20 | End: 2021-08-27

## 2021-08-20 NOTE — PROGRESS NOTES
Assessment/Plan:    Cellulitis of right leg  Start keflex  Call back Monday if not improving       Diagnoses and all orders for this visit:    Cellulitis of right leg  -     cephalexin (KEFLEX) 500 mg capsule; Take 1 capsule (500 mg total) by mouth every 6 (six) hours for 7 days    Encounter for immunization    Other orders  -     Cancel: Ambulatory referral to Gastroenterology; Future  -     Cancel: PNEUMOCOCCAL POLYSACCHARIDE VACCINE 23-VALENT =>1YO SQ IM          Subjective:      Patient ID: Patricio Baron  is a 79 y o  male  Patient is here for right lower leg redness, swelling for 3 days  No pain  On eliquis, no history of dvt      The following portions of the patient's history were reviewed and updated as appropriate: allergies, current medications, past family history, past medical history, past social history, past surgical history and problem list     Review of Systems   Constitutional: Negative  HENT: Negative  Eyes: Negative  Respiratory: Negative  Cardiovascular: Positive for leg swelling  Gastrointestinal: Negative  Musculoskeletal: Negative  Neurological: Negative  Objective:      /62   Pulse 79   Ht 5' 9" (1 753 m)   Wt 84 4 kg (186 lb)   SpO2 97%   BMI 27 47 kg/m²          Physical Exam  Vitals and nursing note reviewed  Constitutional:       Appearance: He is well-developed  HENT:      Head: Normocephalic and atraumatic  Right Ear: External ear normal       Left Ear: External ear normal       Nose: Nose normal    Eyes:      Conjunctiva/sclera: Conjunctivae normal       Pupils: Pupils are equal, round, and reactive to light  Cardiovascular:      Rate and Rhythm: Normal rate and regular rhythm  Pulmonary:      Effort: Pulmonary effort is normal       Breath sounds: Normal breath sounds  Musculoskeletal:      Cervical back: Normal range of motion and neck supple  Legs:    Skin:     General: Skin is warm and dry     Neurological: Mental Status: He is alert and oriented to person, place, and time

## 2021-08-30 ENCOUNTER — OFFICE VISIT (OUTPATIENT)
Dept: INTERNAL MEDICINE CLINIC | Facility: CLINIC | Age: 81
End: 2021-08-30
Payer: COMMERCIAL

## 2021-08-30 VITALS
HEART RATE: 65 BPM | SYSTOLIC BLOOD PRESSURE: 125 MMHG | OXYGEN SATURATION: 96 % | DIASTOLIC BLOOD PRESSURE: 70 MMHG | WEIGHT: 186.6 LBS | HEIGHT: 69 IN | BODY MASS INDEX: 27.64 KG/M2 | TEMPERATURE: 98.1 F

## 2021-08-30 DIAGNOSIS — I83.899 BLEEDING FROM VARICOSE VEIN: Primary | ICD-10-CM

## 2021-08-30 PROCEDURE — 99213 OFFICE O/P EST LOW 20 MIN: CPT | Performed by: INTERNAL MEDICINE

## 2021-08-30 NOTE — PROGRESS NOTES
Assessment/Plan:  Advised to use compression socks  For now, he can get one from the local pharmacy  Keep leg elevated     Problem List Items Addressed This Visit     None      Visit Diagnoses     Bleeding from varicose vein    -  Primary    Relevant Orders    Hemoglobin and hematocrit, blood    Ambulatory referral to Vascular Surgery            Subjective:      Patient ID: Keron Elliott  is a 79 y o  male  HPI  Spontaneous bleeding from a vein in the right lower calf last night  Needed prolonged compression before it stopped  He thinks he lost a pint of blood  He went to the ER and Hgb was 14 2  This morning, the same thing happened and lost a lot of blood again  Bleeding stopped with compression and applied Gorilla Glue  Recent right leg cellulitis around the ankle He had a brush burn on the ankle   Finished a week of Keflex    The following portions of the patient's history were reviewed and updated as appropriate: allergies, current medications, past family history, past medical history, past social history, past surgical history and problem list     Review of Systems   Cardiovascular: Negative for leg swelling  Varicose veins  See hpi         Objective:      /70   Pulse 65   Temp 98 1 °F (36 7 °C) (Temporal)   Ht 5' 9" (1 753 m)   Wt 84 6 kg (186 lb 9 6 oz)   SpO2 96%   BMI 27 56 kg/m²          Physical Exam  Constitutional:       General: He is not in acute distress  Cardiovascular:      Comments: Varicose veins on both LE  Currently no bleeding over the distal calf vein   Pulmonary:      Effort: No respiratory distress  Musculoskeletal:      Right lower leg: No edema  Left lower leg: No edema

## 2021-09-07 LAB
HCT VFR BLD AUTO: 38.7 % (ref 38.5–50)
HGB BLD-MCNC: 13.2 G/DL (ref 13.2–17.1)

## 2021-09-15 ENCOUNTER — CLINICAL SUPPORT (OUTPATIENT)
Dept: VASCULAR SURGERY | Facility: CLINIC | Age: 81
End: 2021-09-15
Payer: COMMERCIAL

## 2021-09-15 VITALS
WEIGHT: 186 LBS | SYSTOLIC BLOOD PRESSURE: 138 MMHG | DIASTOLIC BLOOD PRESSURE: 72 MMHG | HEART RATE: 64 BPM | HEIGHT: 69 IN | RESPIRATION RATE: 18 BRPM | TEMPERATURE: 98.2 F | BODY MASS INDEX: 27.55 KG/M2

## 2021-09-15 DIAGNOSIS — I83.899 BLEEDING FROM VARICOSE VEIN: ICD-10-CM

## 2021-09-15 PROCEDURE — 99202 OFFICE O/P NEW SF 15 MIN: CPT | Performed by: NURSE PRACTITIONER

## 2021-09-15 PROCEDURE — 36470 NJX SCLRSNT 1 INCMPTNT VEIN: CPT | Performed by: NURSE PRACTITIONER

## 2021-09-15 RX ORDER — TAMSULOSIN HYDROCHLORIDE 0.4 MG/1
0.4 CAPSULE ORAL
COMMUNITY
Start: 2021-03-07 | End: 2022-01-13

## 2021-09-15 NOTE — PATIENT INSTRUCTIONS
Leave dressing in place x 48 hours  Wear compression stocking on top of dressing that was put on in office  If too tight, you may remove Coban (brown) dressing, but keep white tape bandage in place  After 48hrs, continue to wear medical grade compression stockings daily until next visit 3-4 weeks  Elevate legs at rest  Call with any questions or concerns    Varicose Veins   AMBULATORY CARE:   Varicose veins  are veins that become large, twisted, and swollen  They are common on the back of the calves, knees, and thighs  Varicose veins are caused by valves in your veins that do not work properly  This causes blood to collect and increase pressure in the veins of your legs  The increased pressure causes your veins to stretch, get larger, swell, and twist      Common symptoms include the following: Your symptoms may be worse after you stand or sit for long periods of time  You may have any of the following:  · Blue, purple, or bulging veins in your legs     · Pain, swelling, or muscle cramps in your legs    · Feeling of fatigue or heaviness in your legs    · Cramping in your legs    Seek care immediately if:   · You have a wound that does not heal or is infected  · You have an injury that has broken your skin and caused your varicose veins to bleed  · Your leg is swollen and hard  · You notice that your legs or feet are turning blue or black  · Your leg feels warm, tender, and painful  It may look swollen and red  Contact your healthcare provider if:   · You have pain in your leg that does not go away or gets worse  · You notice sudden large bruising on your legs  · You have a rash on your leg  · Your symptoms keep you from doing your daily activities  · You have questions or concerns about your condition or care  Treatment of varicose veins  aims to decrease symptoms, improve appearance, and prevent further problems   Treatment will depend on which veins are affected and how severe your condition is  You may need procedures to treat or remove your varicose veins  For example, your healthcare provider may inject a solution or use a laser to close the varicose veins  Surgery to remove long veins may also be done  Ask your healthcare provider for more information about procedures used to treat varicose veins  Manage varicose veins:   · Do not sit or stand for long periods of time  This can cause the blood to collect in your legs and make your symptoms worse  Bend or rotate your ankles several times every hour  Walk around for a few minutes every hour to get blood moving in your legs  · Do not cross your legs when you sit  This decreases blood flow to your feet and can make your symptoms worse  · Do not wear tight clothing or shoes  Do not wear high-heeled shoes  Do not wear clothes that are tight around the waist or knees  · Maintain a healthy weight  Being overweight or obese can make your varicose veins worse  Ask your healthcare provider how much you should weigh  Ask him or her to help you create a weight loss plan if you are overweight  · Wear pressure stockings as directed  The stockings are tight and put pressure on your legs  They improve blood flow and help prevent clots  · Elevate your legs  Keep them above the level of your heart for 15 to 30 minutes several times a day  You can also prop the end of your bed up slightly to elevate your legs while you sleep  This will help blood to flow back to your heart  · Get regular exercise  Talk to your healthcare provider about the best exercise plan for you  Exercise can improve blood flow to your legs and feet  Follow up with your healthcare provider as directed:  Write down your questions so you remember to ask them during your visits  © [employer] 2021 Information is for End User's use only and may not be sold, redistributed or otherwise used for commercial purposes   All illustrations and images included in CareNotes® are the copyrighted property of A D A M [employer] Inc  or Alvaro Roberson  The above information is an  only  It is not intended as medical advice for individual conditions or treatments  Talk to your doctor, nurse or pharmacist before following any medical regimen to see if it is safe and effective for you

## 2021-09-15 NOTE — ASSESSMENT & PLAN NOTE
Patient is a 79-year-old male with history of Afib (Eliquis), CMP, GERD, HTN, venous insufficiency with varicose veins who presents the office today s/p bleeding varicosity on RLE     - 08/29/2021 patient was at home and noticed bleeding from right lower extremity  Tried to control with pressure at home, was unable to stop and was taken to the ER  By arrival patient no longer had bleeding and was discharged home  The following morning varicosity reopened and started bleeding again, this time patient was able to control bleeding and also applied Ladena [place]  Patient was seen by PCP and referred to vascular for evaluation and medical sclero injections  Plan:  - medical foam sclerotherapy injection with Asclera 1% (2mL) to right lower extremity lateral distal calf bleeding varicosity  - keep dressing in place for 48 hours, then removed dressing and continue with compression stocking  - daily use of medical grade compression stockings 20-30 mmHg    On a m , off in p m    - Leg elevation at rest  -return to office in 3-4 weeks for follow-up  -return to office sooner for bleeding, signs and symptoms of infection

## 2021-09-15 NOTE — PROGRESS NOTES
Assessment/Plan:    Bleeding from varicose vein  Patient is a 79-year-old male with history of Afib (Eliquis), CMP, GERD, HTN, venous insufficiency with varicose veins who presents the office today s/p bleeding varicosity on RLE     - 08/29/2021 patient was at home and noticed bleeding from right lower extremity  Tried to control with pressure at home, was unable to stop and was taken to the ER  By arrival patient no longer had bleeding and was discharged home  The following morning varicosity reopened and started bleeding again, this time patient was able to control bleeding and also applied Farzana Bowling  Patient was seen by PCP and referred to vascular for evaluation and medical sclero injections  Plan:  - medical foam sclerotherapy injection with Asclera 1% (2mL) to right lower extremity lateral distal calf bleeding varicosity  - keep dressing in place for 48 hours, then removed dressing and continue with compression stocking  - daily use of medical grade compression stockings 20-30 mmHg  On a m , off in p m    - Leg elevation at rest  -return to office in 3-4 weeks for follow-up  -return to office sooner for bleeding, signs and symptoms of infection       Diagnoses and all orders for this visit:    Bleeding from varicose vein  -     Ambulatory referral to Vascular Surgery    Other orders  -     apixaban (ELIQUIS) 5 mg; Take 5 mg by mouth 2 (two) times a day  -     tamsulosin (FLOMAX) 0 4 mg; Take 0 4 mg by mouth          Subjective:      Patient ID: Vaibhav Esteves  is a 79 y o  male  New patient referred by Adelina Lange MD  Patient presents in office for injection of bleeding vein   Patient reports having a R leg bleeding vein that occurred on 8/29/2021 and went to the hospital  Patient reports the bleeding had stopped by time he got to the hospital so no interventions were done while at the hospital  On 8/30/2021 after the patient took a shower when he was drying off the R leg vein began to bleed again  Patient put gorilla glue on the bleeding vein and it stopped  Patient reports he has compression stockings and does wear them  Patient is taking aspirin  See Assessment and Plan     PROCEDURE:  Patient advised or risks of pain upon injection, redness and swelling after treatment, bruising and bleeding, necrosis/ulceration, allergy, discoloration and the likelihood that multiple treatments may be necessary and clearance may not be complete  Affected areas on the RLE were treated with intravascular injections of 2 cc of  1% Asclera using a 32G syringe per 's protocol  The patient tolerated the procedure well with minimal erythema and edema  Immediate pressure was applied with cotton balls secured with tape at the injection sites  Coban wrap applied  Patient has medical grade compression stockings 20-30 mmHg at home and will apply  The patient was advised to wear compression stockings during the day for the next 3-4 weeks until follow up visit        Follow up in 3-4weeks        The following portions of the patient's history were reviewed and updated as appropriate: allergies, current medications, past family history, past medical history, past social history, past surgical history and problem list     Review of Systems   Constitutional: Negative  Negative for chills and fever  HENT: Negative  Negative for hearing loss and trouble swallowing  Eyes: Negative  Negative for visual disturbance  Respiratory: Negative  Negative for cough, chest tightness and shortness of breath  Cardiovascular: Positive for leg swelling  Gastrointestinal: Negative  Negative for diarrhea, nausea and vomiting  Endocrine: Negative  Genitourinary: Negative  Musculoskeletal: Negative  Negative for gait problem  Skin: Positive for wound  Allergic/Immunologic: Negative  Neurological: Negative  Negative for dizziness, speech difficulty, weakness, numbness and headaches     Hematological: Bruises/bleeds easily  Psychiatric/Behavioral: Negative  Negative for self-injury and suicidal ideas  I have reviewed and made appropriate changes to the review of systems input by the medical assistant  Objective:      /72 (BP Location: Right arm, Patient Position: Sitting, Cuff Size: Adult)   Pulse 64   Temp 98 2 °F (36 8 °C) (Tympanic)   Resp 18   Ht 5' 9" (1 753 m)   Wt 84 4 kg (186 lb)   BMI 27 47 kg/m²          Physical Exam  Vitals reviewed  Constitutional:       Appearance: Normal appearance  Cardiovascular:      Rate and Rhythm: Normal rate  Pulses: Normal pulses  Posterior tibial pulses are 2+ on the right side and 2+ on the left side  Heart sounds: Normal heart sounds  Pulmonary:      Effort: Pulmonary effort is normal       Breath sounds: Normal breath sounds  Musculoskeletal:         General: Normal range of motion  Right lower leg: Edema present  Left lower leg: Edema present  Skin:     General: Skin is warm and dry  Comments: Bilateral lower extremities with varicose veins R>L    RLE lateral distal calf with 1cm x 1cm purple vein   Neurological:      Mental Status: He is alert and oriented to person, place, and time     Psychiatric:         Mood and Affect: Mood normal          Behavior: Behavior normal            RLE lateral    Vitals:    09/15/21 1350   BP: 138/72   BP Location: Right arm   Patient Position: Sitting   Cuff Size: Adult   Pulse: 64   Resp: 18   Temp: 98 2 °F (36 8 °C)   TempSrc: Tympanic   Weight: 84 4 kg (186 lb)   Height: 5' 9" (1 753 m)       Patient Active Problem List   Diagnosis    Gastroesophageal reflux disease    Atrial fibrillation (HCC)    Dilated cardiomyopathy (HCC)    Nonrheumatic mitral valve regurgitation    Hypertension, essential, benign    Cellulitis of right leg    Bleeding from varicose vein       Past Surgical History:   Procedure Laterality Date    APPENDECTOMY      CARDIOVERSION  COLONOSCOPY      HERNIA REPAIR      JOINT REPLACEMENT      right knee    KNEE SURGERY  2015    OR ESOPHAGOGASTRODUODENOSCOPY TRANSORAL DIAGNOSTIC N/A 2016    Procedure: ESOPHAGOGASTRODUODENOSCOPY (EGD); Surgeon: Scar Huff MD;  Location: [facility]; Service: Gastroenterology    WISDOM TOOTH EXTRACTION         Family History   Problem Relation Age of Onset    Cancer Mother         Breast    Prostate cancer Father        Social History     Socioeconomic History    Marital status: /Civil Union     Spouse name: Not on file    Number of children: Not on file    Years of education: Not on file    Highest education level: Not on file   Occupational History    Not on file   Tobacco Use    Smoking status: Never Smoker    Smokeless tobacco: Never Used    Tobacco comment: Most recent tobacco use screenin2018    Vaping Use    Vaping Use: Never used   Substance and Sexual Activity    Alcohol use: No    Drug use: No    Sexual activity: Not Currently   Other Topics Concern    Not on file   Social History Narrative    Not on file     Social Determinants of Health     Financial Resource Strain:     Difficulty of Paying Living Expenses:    Food Insecurity:     Worried About Running Out of Food in the Last Year:     Ran Out of Food in the Last Year:    Transportation Needs:     Lack of Transportation (Medical):      Lack of Transportation (Non-Medical):    Physical Activity:     Days of Exercise per Week:     Minutes of Exercise per Session:    Stress:     Feeling of Stress :    Social Connections:     Frequency of Communication with Friends and Family:     Frequency of Social Gatherings with Friends and Family:     Attends [employer] Services:     Active Member of Clubs or Organizations:     Attends Club or Organization Meetings:     Marital Status:    Intimate Partner Violence:     Fear of Current or Ex-Partner:     Emotionally Abused:     Physically Abused:     Sexually Abused:         Allergies   Allergen Reactions    Celecoxib GI Intolerance    Dabigatran     Rivaroxaban     Rofecoxib Other (See Comments)    Sotalol      Other reaction(s): decreased blood pressure         Current Outpatient Medications:     amiodarone 200 mg tablet, Take 200 mg by mouth daily, Disp: , Rfl:     apixaban (ELIQUIS) 5 mg, Take 5 mg by mouth 2 (two) times a day, Disp: , Rfl:     aspirin (ECOTRIN LOW STRENGTH) 81 mg EC tablet, Take 81 mg by mouth daily, Disp: , Rfl:     Calcium Carbonate-Vitamin D (CALTRATE 600+D PO), Take 1 tablet by mouth daily, Disp: , Rfl:     lansoprazole (PREVACID) 30 mg capsule, Take 1 capsule (30 mg total) by mouth daily, Disp: 90 capsule, Rfl: 3    Multiple Vitamins-Minerals (OCUVITE PO), Take 1 tablet by mouth daily, Disp: , Rfl:     multivitamin (THERAGRAN) TABS, Take 1 tablet by mouth daily, Disp: , Rfl:     tamsulosin (FLOMAX) 0 4 mg, Take 0 4 mg by mouth, Disp: , Rfl:     valsartan (DIOVAN) 40 mg tablet, Take 40 mg by mouth daily, Disp: , Rfl:     Zn-Pyg Afri-Nettle-Saw Palmet (SAW PALMETTO COMPLEX PO), Take by mouth, Disp: , Rfl:

## 2021-09-16 ENCOUNTER — TELEPHONE (OUTPATIENT)
Dept: VASCULAR SURGERY | Facility: CLINIC | Age: 81
End: 2021-09-16

## 2021-09-16 NOTE — TELEPHONE ENCOUNTER
I told him there was most likely a pressure dressing placed and ? If this is what he was seeing - he didn't know because he didn't notice it until today, he is unsure if it was there yesterday  I called him back and he "pushed on it and it feels soft like cotton balls"  He does not notice any drainage or swelling around this area  Advised if he thinks it is enlarging or develops drainage or other concerns to notify us immediately  Pt verbalized understanding of same

## 2021-09-16 NOTE — TELEPHONE ENCOUNTER
Pt called, he s/p sclerotherapy yesterday by Paloma RAMIREZ  He has the compression bandage in place and also compression stocking  He notes lump at injection site measures 1/12" in diameter and "sticks out 1/2" or more"  ? If this was present yesterday, he states he did not notice it yesterday, just noticed it today  He denies pain at site  Advised I would route message to [place] and get back to him

## 2021-09-16 NOTE — TELEPHONE ENCOUNTER
This is probably the pressure dressing he is noticing?? Cotton balls and gauze were packed under tape and made a lump  If he did not remove dressing, this is probably what he is seeing/ feeling  Is it getting bigger? Or notice anything outside of dressing?

## 2021-10-06 ENCOUNTER — OFFICE VISIT (OUTPATIENT)
Dept: VASCULAR SURGERY | Facility: CLINIC | Age: 81
End: 2021-10-06
Payer: COMMERCIAL

## 2021-10-06 VITALS
DIASTOLIC BLOOD PRESSURE: 80 MMHG | SYSTOLIC BLOOD PRESSURE: 150 MMHG | HEIGHT: 69 IN | WEIGHT: 186 LBS | BODY MASS INDEX: 27.55 KG/M2

## 2021-10-06 DIAGNOSIS — I83.899 BLEEDING FROM VARICOSE VEIN: Primary | ICD-10-CM

## 2021-10-06 PROCEDURE — 99213 OFFICE O/P EST LOW 20 MIN: CPT | Performed by: NURSE PRACTITIONER

## 2021-10-06 RX ORDER — PREDNISOLONE ACETATE 10 MG/ML
SUSPENSION/ DROPS OPHTHALMIC
COMMUNITY
Start: 2021-09-24

## 2021-11-01 ENCOUNTER — NEW PATIENT (OUTPATIENT)
Dept: URBAN - METROPOLITAN AREA CLINIC 6 | Facility: CLINIC | Age: 81
End: 2021-11-01

## 2021-11-01 DIAGNOSIS — H52.7: ICD-10-CM

## 2021-11-01 DIAGNOSIS — Z96.1: ICD-10-CM

## 2021-11-01 PROCEDURE — 92004 COMPRE OPH EXAM NEW PT 1/>: CPT

## 2021-11-01 PROCEDURE — 92015 DETERMINE REFRACTIVE STATE: CPT

## 2021-11-01 PROCEDURE — 1036F TOBACCO NON-USER: CPT

## 2021-11-01 PROCEDURE — G8427 DOCREV CUR MEDS BY ELIG CLIN: HCPCS

## 2021-11-01 ASSESSMENT — TONOMETRY
OS_IOP_MMHG: 17
OD_IOP_MMHG: 17

## 2021-11-01 ASSESSMENT — VISUAL ACUITY
OD_CC: 20/25-1
OS_PH: 20/60+2
OS_CC: 20/200
OU_CC: J2+3

## 2022-01-04 LAB
ALBUMIN SERPL-MCNC: 3.9 G/DL (ref 3.6–5.1)
ALBUMIN/GLOB SERPL: 2.1 (CALC) (ref 1–2.5)
ALP SERPL-CCNC: 66 U/L (ref 35–144)
ALT SERPL-CCNC: 40 U/L (ref 9–46)
AST SERPL-CCNC: 31 U/L (ref 10–35)
BILIRUB SERPL-MCNC: 0.9 MG/DL (ref 0.2–1.2)
BUN SERPL-MCNC: 24 MG/DL (ref 7–25)
BUN/CREAT SERPL: 16 (CALC) (ref 6–22)
CALCIUM SERPL-MCNC: 9.2 MG/DL (ref 8.6–10.3)
CHLORIDE SERPL-SCNC: 106 MMOL/L (ref 98–110)
CHOLEST SERPL-MCNC: 184 MG/DL
CHOLEST/HDLC SERPL: 4.4 (CALC)
CO2 SERPL-SCNC: 28 MMOL/L (ref 20–32)
CREAT SERPL-MCNC: 1.48 MG/DL (ref 0.7–1.11)
GLOBULIN SER CALC-MCNC: 1.9 G/DL (CALC) (ref 1.9–3.7)
GLUCOSE SERPL-MCNC: 89 MG/DL (ref 65–99)
HDLC SERPL-MCNC: 42 MG/DL
LDLC SERPL CALC-MCNC: 125 MG/DL (CALC)
NONHDLC SERPL-MCNC: 142 MG/DL (CALC)
POTASSIUM SERPL-SCNC: 4.3 MMOL/L (ref 3.5–5.3)
PROT SERPL-MCNC: 5.8 G/DL (ref 6.1–8.1)
SL AMB EGFR AFRICAN AMERICAN: 50 ML/MIN/1.73M2
SL AMB EGFR NON AFRICAN AMERICAN: 43 ML/MIN/1.73M2
SODIUM SERPL-SCNC: 141 MMOL/L (ref 135–146)
TRIGL SERPL-MCNC: 80 MG/DL

## 2022-01-13 ENCOUNTER — OFFICE VISIT (OUTPATIENT)
Dept: INTERNAL MEDICINE CLINIC | Facility: CLINIC | Age: 82
End: 2022-01-13
Payer: COMMERCIAL

## 2022-01-13 VITALS
WEIGHT: 185.8 LBS | OXYGEN SATURATION: 97 % | DIASTOLIC BLOOD PRESSURE: 60 MMHG | HEART RATE: 74 BPM | TEMPERATURE: 97.6 F | RESPIRATION RATE: 18 BRPM | BODY MASS INDEX: 27.52 KG/M2 | SYSTOLIC BLOOD PRESSURE: 128 MMHG | HEIGHT: 69 IN

## 2022-01-13 DIAGNOSIS — N17.9 AKI (ACUTE KIDNEY INJURY) (HCC): ICD-10-CM

## 2022-01-13 DIAGNOSIS — I10 HYPERTENSION, ESSENTIAL, BENIGN: Primary | ICD-10-CM

## 2022-01-13 DIAGNOSIS — E78.2 MIXED HYPERLIPIDEMIA: ICD-10-CM

## 2022-01-13 DIAGNOSIS — I83.899 BLEEDING FROM VARICOSE VEIN: ICD-10-CM

## 2022-01-13 DIAGNOSIS — I48.0 PAROXYSMAL ATRIAL FIBRILLATION (HCC): ICD-10-CM

## 2022-01-13 DIAGNOSIS — K21.9 GASTROESOPHAGEAL REFLUX DISEASE, UNSPECIFIED WHETHER ESOPHAGITIS PRESENT: ICD-10-CM

## 2022-01-13 PROBLEM — L03.115 CELLULITIS OF RIGHT LEG: Status: RESOLVED | Noted: 2021-08-20 | Resolved: 2022-01-13

## 2022-01-13 PROCEDURE — 99214 OFFICE O/P EST MOD 30 MIN: CPT | Performed by: INTERNAL MEDICINE

## 2022-01-13 RX ORDER — AMOXICILLIN 500 MG/1
CAPSULE ORAL
COMMUNITY
Start: 2021-11-30

## 2022-01-13 RX ORDER — LANSOPRAZOLE 30 MG/1
30 CAPSULE, DELAYED RELEASE ORAL DAILY
Qty: 90 CAPSULE | Refills: 3 | Status: SHIPPED | OUTPATIENT
Start: 2022-01-13

## 2022-01-13 NOTE — PROGRESS NOTES
Assessment/Plan:    Gastroesophageal reflux disease  Symptom was that he has dysphagia and needed esophageal dilatation  Continue lansoprazole    Hypertension, essential, benign  Continue valsartan    Paroxysmal A-fib (HCC)  He is on amiodarone and Eliquis  Echo scheduled in March    Bleeding from varicose vein  Compliant with compression stockings      Get non-fasting BMP to recheck creatinine in about 2 weeks  Discussed d/c PSA screening and he agreed  Up to date with vaccines-flu and COVID booster       Problem List Items Addressed This Visit        Digestive    Gastroesophageal reflux disease     Symptom was that he has dysphagia and needed esophageal dilatation  Continue lansoprazole         Relevant Medications    lansoprazole (PREVACID) 30 mg capsule       Cardiovascular and Mediastinum    Paroxysmal A-fib ([facility] [address] )     He is on amiodarone and Eliquis  Echo scheduled in March         Hypertension, essential, benign - Primary     Continue valsartan         Relevant Orders    CBC and Platelet    Comprehensive metabolic panel    Bleeding from varicose vein     Compliant with compression stockings           Other Visit Diagnoses     JACOB (acute kidney injury) ([facility] [address] )        Relevant Orders    Basic metabolic panel    Mixed hyperlipidemia        Relevant Orders    Lipid Panel with Direct LDL reflex            Subjective:      Patient ID: Tatianna Weeks  is a 79 y o  male  HPI  Here for a follow up  Still struggling with wife's death in 2020 from advanced cancer  Daughter lives with him  He stays active  Recent labs reviewed-LDL better at 125, Cr 1 4  The following portions of the patient's history were reviewed and updated as appropriate: allergies, current medications, past family history, past medical history, past social history, past surgical history and problem list     Review of Systems   Constitutional: Negative for chills, fatigue, fever and unexpected weight change  HENT: Negative for rhinorrhea  Respiratory: Negative for cough and shortness of breath  Cardiovascular: Positive for leg swelling (RLE controlled with compression stocking)  Negative for chest pain and palpitations  Gastrointestinal: Negative for abdominal pain, constipation, diarrhea, nausea and vomiting  Genitourinary: Negative for difficulty urinating  Neurological: Negative for dizziness and headaches  Objective:      /60   Pulse 74   Temp 97 6 °F (36 4 °C)   Resp 18   Ht 5' 9" (1 753 m)   Wt 84 3 kg (185 lb 12 8 oz)   SpO2 97%   BMI 27 44 kg/m²          Physical Exam  Constitutional:       General: He is not in acute distress  Appearance: He is well-developed  He is not ill-appearing, toxic-appearing or diaphoretic  HENT:      Head: Normocephalic and atraumatic  Comments: Wound left outer ear (from hitting ear on shelf a month ago)  Eyes:      Conjunctiva/sclera: Conjunctivae normal    Cardiovascular:      Rate and Rhythm: Normal rate and regular rhythm  Heart sounds: Normal heart sounds  No murmur heard  Pulmonary:      Effort: Pulmonary effort is normal  No respiratory distress  Breath sounds: Normal breath sounds  No wheezing or rales  Abdominal:      General: There is no distension  Palpations: Abdomen is soft  There is no mass  Tenderness: There is no abdominal tenderness  There is no guarding or rebound  Musculoskeletal:      Cervical back: Neck supple  Right lower leg: No edema  Left lower leg: No edema  Skin:     General: Skin is warm and dry  Neurological:      Mental Status: He is alert and oriented to person, place, and time  Psychiatric:         Mood and Affect: Mood normal          Behavior: Behavior normal          Thought Content:  Thought content normal          Judgment: Judgment normal

## 2022-01-26 LAB
BUN SERPL-MCNC: 18 MG/DL (ref 7–25)
BUN/CREAT SERPL: 16 (CALC) (ref 6–22)
CALCIUM SERPL-MCNC: 9.3 MG/DL (ref 8.6–10.3)
CHLORIDE SERPL-SCNC: 107 MMOL/L (ref 98–110)
CO2 SERPL-SCNC: 28 MMOL/L (ref 20–32)
CREAT SERPL-MCNC: 1.14 MG/DL (ref 0.7–1.11)
GLUCOSE SERPL-MCNC: 89 MG/DL (ref 65–99)
POTASSIUM SERPL-SCNC: 4.2 MMOL/L (ref 3.5–5.3)
SL AMB EGFR AFRICAN AMERICAN: 69 ML/MIN/1.73M2
SL AMB EGFR NON AFRICAN AMERICAN: 60 ML/MIN/1.73M2
SODIUM SERPL-SCNC: 141 MMOL/L (ref 135–146)

## 2022-03-30 ENCOUNTER — TELEPHONE (OUTPATIENT)
Dept: INTERNAL MEDICINE CLINIC | Facility: CLINIC | Age: 82
End: 2022-03-30

## 2022-04-27 RX ORDER — TAMSULOSIN HYDROCHLORIDE 0.4 MG/1
0.4 CAPSULE ORAL DAILY
Qty: 30 CAPSULE | Refills: 5 | OUTPATIENT
Start: 2022-04-27

## 2022-05-02 ENCOUNTER — OFFICE VISIT (OUTPATIENT)
Dept: INTERNAL MEDICINE CLINIC | Facility: CLINIC | Age: 82
End: 2022-05-02
Payer: COMMERCIAL

## 2022-05-02 VITALS
BODY MASS INDEX: 28.11 KG/M2 | HEIGHT: 69 IN | DIASTOLIC BLOOD PRESSURE: 74 MMHG | RESPIRATION RATE: 14 BRPM | WEIGHT: 189.8 LBS | TEMPERATURE: 97.5 F | SYSTOLIC BLOOD PRESSURE: 142 MMHG | HEART RATE: 76 BPM | OXYGEN SATURATION: 96 %

## 2022-05-02 DIAGNOSIS — N40.1 BENIGN PROSTATIC HYPERPLASIA (BPH) WITH STRAINING ON URINATION: Primary | ICD-10-CM

## 2022-05-02 DIAGNOSIS — R39.16 BENIGN PROSTATIC HYPERPLASIA (BPH) WITH STRAINING ON URINATION: Primary | ICD-10-CM

## 2022-05-02 PROCEDURE — 99213 OFFICE O/P EST LOW 20 MIN: CPT | Performed by: INTERNAL MEDICINE

## 2022-05-02 RX ORDER — TAMSULOSIN HYDROCHLORIDE 0.4 MG/1
0.4 CAPSULE ORAL
Qty: 90 CAPSULE | Refills: 1 | Status: SHIPPED | OUTPATIENT
Start: 2022-05-02 | End: 2022-07-18

## 2022-05-02 NOTE — PROGRESS NOTES
Assessment/Plan:  Resume tamsulosin every night  If symptoms do not continue to improve, f/u with urology       Problem List Items Addressed This Visit        Other    Benign prostatic hyperplasia (BPH) with straining on urination - Primary    Relevant Medications    tamsulosin (FLOMAX) 0 4 mg            Subjective:      Patient ID: Ren Severance  is a 79 y o  male  HPI  C/o difficulty urinating when he gets up at night that he must strain  It is not entirely new but seems worse  This does not happen during the day  He took Flomax for a short period after he had surgery last year for the same symptoms  He had Flomax left so started it 3 days ago with relief of symptoms    The following portions of the patient's history were reviewed and updated as appropriate: allergies, current medications, past family history, past medical history, past social history, past surgical history and problem list     Review of Systems   Constitutional: Negative for chills and fever  Gastrointestinal: Negative for abdominal pain, constipation and diarrhea  Genitourinary: Positive for difficulty urinating  Negative for dysuria and hematuria  Neurological: Negative for dizziness  Objective:      /74   Pulse 76   Temp 97 5 °F (36 4 °C)   Resp 14   Ht 5' 9" (1 753 m)   Wt 86 1 kg (189 lb 12 8 oz)   SpO2 96%   BMI 28 03 kg/m²          Physical Exam  Constitutional:       General: He is not in acute distress  Appearance: He is not ill-appearing, toxic-appearing or diaphoretic  Cardiovascular:      Rate and Rhythm: Regular rhythm  Heart sounds: No murmur heard  Pulmonary:      Breath sounds: Normal breath sounds  Abdominal:      General: There is no distension  Palpations: Abdomen is soft  Tenderness: There is no abdominal tenderness     Psychiatric:         Behavior: Behavior normal

## 2022-05-23 ENCOUNTER — OFFICE VISIT (OUTPATIENT)
Dept: INTERNAL MEDICINE CLINIC | Facility: CLINIC | Age: 82
End: 2022-05-23
Payer: COMMERCIAL

## 2022-05-23 VITALS
HEIGHT: 69 IN | WEIGHT: 189.8 LBS | OXYGEN SATURATION: 98 % | BODY MASS INDEX: 28.11 KG/M2 | HEART RATE: 77 BPM | SYSTOLIC BLOOD PRESSURE: 132 MMHG | DIASTOLIC BLOOD PRESSURE: 72 MMHG

## 2022-05-23 DIAGNOSIS — M79.89 RIGHT LEG SWELLING: ICD-10-CM

## 2022-05-23 DIAGNOSIS — Z23 ENCOUNTER FOR IMMUNIZATION: Primary | ICD-10-CM

## 2022-05-23 DIAGNOSIS — H61.92 EARLOBE LESION, LEFT: ICD-10-CM

## 2022-05-23 DIAGNOSIS — I42.0 DCM (DILATED CARDIOMYOPATHY) (HCC): ICD-10-CM

## 2022-05-23 PROCEDURE — 99213 OFFICE O/P EST LOW 20 MIN: CPT | Performed by: GENERAL ACUTE CARE HOSPITAL

## 2022-05-23 NOTE — PROGRESS NOTES
Assessment/Plan:    Right leg swelling  On exam noticed right leg swelling more than left  Varicose vein b/l  Most likely 2/2 venous insufficiency  No sign of infection  DVT less likely he takes eliquis will do doppler to rule out  No sign of decompensated CHF f/u with cardiology  Recommend compression stocking and leg evelation  Earlobe lesion, left  Non healing wound for 6 months  Saw derm had unremarkable biopsy  Still not healing  Recommend to f/u with derm may need to repeat biopsy  Diagnoses and all orders for this visit:    Encounter for immunization    Right leg swelling  -     VAS lower limb venous duplex study, unilateral/limited; Future    DCM (dilated cardiomyopathy) (HCC)    Earlobe lesion, left          Subjective:      Patient ID: Bhakti Zelaya  is a 79 y o  male  HPI    Right leg swelling: noticed by his family member  No pain, hx of injury, rash, etc    Hx of venous insufficiency and varicose vein  Denies CP, SOB, orthopnea/PND  Sees cardiology had Echo 3/2022 unremarkable  Also noticed unhealing wound on left ear for 6 month after initial injury  Saw derm had biopsy unremarkable  The following portions of the patient's history were reviewed and updated as appropriate: allergies, past family history, past social history and past surgical history      Review of Systems      Objective:      /72   Pulse 77   Ht 5' 9" (1 753 m)   Wt 86 1 kg (189 lb 12 8 oz)   SpO2 98%   BMI 28 03 kg/m²          Physical Exam

## 2022-05-23 NOTE — ASSESSMENT & PLAN NOTE
On exam noticed right leg swelling more than left  Varicose vein b/l  Most likely 2/2 venous insufficiency  No sign of infection  DVT less likely he takes eliquis will do doppler to rule out  No sign of decompensated CHF f/u with cardiology  Recommend compression stocking and leg evelation

## 2022-05-23 NOTE — PATIENT INSTRUCTIONS
Right leg swelling most likely from venous insufficiency  It's important to keep your compression stocking on and elevated your legs during the day whenever you are seated  Will do venous doppler to rule out blood clot (very unlikely because you are already on blood thinner but we need to rule out)

## 2022-05-23 NOTE — ASSESSMENT & PLAN NOTE
Non healing wound for 6 months  Saw derm had unremarkable biopsy  Still not healing  Recommend to f/u with derm may need to repeat biopsy

## 2022-05-27 ENCOUNTER — HOSPITAL ENCOUNTER (OUTPATIENT)
Dept: NON INVASIVE DIAGNOSTICS | Facility: CLINIC | Age: 82
Discharge: HOME | End: 2022-05-27
Payer: COMMERCIAL

## 2022-05-27 DIAGNOSIS — M79.89 RIGHT LEG SWELLING: ICD-10-CM

## 2022-05-27 PROCEDURE — 93971 EXTREMITY STUDY: CPT | Performed by: SURGERY

## 2022-05-27 PROCEDURE — 93971 EXTREMITY STUDY: CPT

## 2022-07-11 LAB
ALBUMIN SERPL-MCNC: 4.1 G/DL (ref 3.6–5.1)
ALBUMIN/GLOB SERPL: 2.1 (CALC) (ref 1–2.5)
ALP SERPL-CCNC: 62 U/L (ref 35–144)
ALT SERPL-CCNC: 46 U/L (ref 9–46)
AST SERPL-CCNC: 34 U/L (ref 10–35)
BILIRUB SERPL-MCNC: 0.8 MG/DL (ref 0.2–1.2)
BUN SERPL-MCNC: 24 MG/DL (ref 7–25)
BUN/CREAT SERPL: 20 (CALC) (ref 6–22)
CALCIUM SERPL-MCNC: 9.5 MG/DL (ref 8.6–10.3)
CHLORIDE SERPL-SCNC: 107 MMOL/L (ref 98–110)
CHOLEST SERPL-MCNC: 201 MG/DL
CHOLEST/HDLC SERPL: 4.1 (CALC)
CO2 SERPL-SCNC: 28 MMOL/L (ref 20–32)
CREAT SERPL-MCNC: 1.23 MG/DL (ref 0.7–1.22)
GFR/BSA.PRED SERPLBLD CYS-BASED-ARV: 59 ML/MIN/1.73M2
GLOBULIN SER CALC-MCNC: 2 G/DL (CALC) (ref 1.9–3.7)
GLUCOSE SERPL-MCNC: 100 MG/DL (ref 65–99)
HDLC SERPL-MCNC: 49 MG/DL
LDLC SERPL CALC-MCNC: 133 MG/DL (CALC)
NONHDLC SERPL-MCNC: 152 MG/DL (CALC)
POTASSIUM SERPL-SCNC: 4.2 MMOL/L (ref 3.5–5.3)
PROT SERPL-MCNC: 6.1 G/DL (ref 6.1–8.1)
SODIUM SERPL-SCNC: 142 MMOL/L (ref 135–146)
TRIGL SERPL-MCNC: 93 MG/DL

## 2022-07-18 ENCOUNTER — OFFICE VISIT (OUTPATIENT)
Dept: INTERNAL MEDICINE CLINIC | Facility: CLINIC | Age: 82
End: 2022-07-18
Payer: COMMERCIAL

## 2022-07-18 VITALS
RESPIRATION RATE: 16 BRPM | BODY MASS INDEX: 27.85 KG/M2 | HEART RATE: 65 BPM | WEIGHT: 188 LBS | OXYGEN SATURATION: 94 % | DIASTOLIC BLOOD PRESSURE: 72 MMHG | SYSTOLIC BLOOD PRESSURE: 138 MMHG | TEMPERATURE: 97.6 F | HEIGHT: 69 IN

## 2022-07-18 DIAGNOSIS — E78.2 MIXED HYPERLIPIDEMIA: ICD-10-CM

## 2022-07-18 DIAGNOSIS — R73.01 IMPAIRED FASTING BLOOD SUGAR: ICD-10-CM

## 2022-07-18 DIAGNOSIS — I48.0 PAROXYSMAL A-FIB (HCC): ICD-10-CM

## 2022-07-18 DIAGNOSIS — N40.1 BENIGN PROSTATIC HYPERPLASIA (BPH) WITH STRAINING ON URINATION: ICD-10-CM

## 2022-07-18 DIAGNOSIS — Z00.00 MEDICARE ANNUAL WELLNESS VISIT, SUBSEQUENT: Primary | ICD-10-CM

## 2022-07-18 DIAGNOSIS — I83.891 VARICOSE VEINS OF RIGHT LEG WITH EDEMA: ICD-10-CM

## 2022-07-18 DIAGNOSIS — R39.16 BENIGN PROSTATIC HYPERPLASIA (BPH) WITH STRAINING ON URINATION: ICD-10-CM

## 2022-07-18 DIAGNOSIS — I10 HYPERTENSION, ESSENTIAL, BENIGN: ICD-10-CM

## 2022-07-18 PROBLEM — I83.899 BLEEDING FROM VARICOSE VEIN: Status: RESOLVED | Noted: 2021-09-15 | Resolved: 2022-07-18

## 2022-07-18 PROBLEM — M79.89 RIGHT LEG SWELLING: Status: RESOLVED | Noted: 2022-05-23 | Resolved: 2022-07-18

## 2022-07-18 PROCEDURE — G0439 PPPS, SUBSEQ VISIT: HCPCS | Performed by: INTERNAL MEDICINE

## 2022-07-18 PROCEDURE — 99214 OFFICE O/P EST MOD 30 MIN: CPT | Performed by: INTERNAL MEDICINE

## 2022-07-18 RX ORDER — ASPIRIN 81 MG/1
81 TABLET ORAL DAILY
COMMUNITY

## 2022-07-18 NOTE — PATIENT INSTRUCTIONS

## 2022-07-18 NOTE — PROGRESS NOTES
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     Continue compression socks            Other    Benign prostatic hyperplasia (BPH) with straining on urination     Says he tried Flomax for 2 weeks in May without improvement  He goes to the bathroom at night once only  He denies trouble urinating so will not try again at this time         Mixed hyperlipidemia     I asked that he discuss cholesterol medications with his cardiologist         Relevant Orders    Comprehensive metabolic panel    Lipid Panel with Direct LDL reflex      Other Visit Diagnoses     Medicare annual wellness visit, subsequent    -  Primary    Impaired fasting blood sugar        Relevant Orders    Comprehensive metabolic panel    HEMOGLOBIN A1C W/ EAG ESTIMATION        BMI Counseling: Body mass index is 27 76 kg/m²  The BMI is above normal  Nutrition recommendations include consuming healthier snacks and reducing intake of saturated and trans fat  Rationale for BMI follow-up plan is due to patient being overweight or obese  Depression Screening and Follow-up Plan: Patient was screened for depression during today's encounter  They screened negative with a PHQ-2 score of 0  Preventive health issues were discussed with patient, and age appropriate screening tests were ordered as noted in patient's After Visit Summary  Personalized health advice and appropriate referrals for health education or preventive services given if needed, as noted in patient's After Visit Summary       History of Present Illness:     Patient presents for a Medicare Wellness Visit    Here for a follow up  Feeling well overall  Needs to have form completed for patient assistance for Eliquis  Recent labs reviewed Cr sl elevated 1 23, , cholesterol elevated         Patient Care Team:  Kyle Andrews MD as PCP - General (Internal Medicine)  Jerlene Sacks, MD as PCP - [address] [place] [address] (RTE)  Ella Lucas MD     Review of Systems:     Review of Systems   Constitutional: Negative for chills, fever and unexpected weight change  HENT: Negative for trouble swallowing  Respiratory: Negative for shortness of breath  Cardiovascular: Positive for leg swelling (right leg and uses compression socks)  Negative for chest pain and palpitations  Gastrointestinal: Negative for abdominal pain, constipation and diarrhea  Genitourinary: Negative for difficulty urinating  Nocturia and cannot fall back to sleep   Neurological: Negative for dizziness and headaches  Problem List:     Patient Active Problem List   Diagnosis    Gastroesophageal reflux disease    [facility]oxysmal [facility] ([facility] )    Dilated cardiomyopathy ([facility] )    Nonrheumatic mitral valve regurgitation    Hypertension, essential, benign    Benign prostatic hyperplasia (BPH) with straining on urination    Earlobe lesion, left    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    Mixed hyperlipidemia      Past Medical and Surgical History:     Past Medical History:   Diagnosis Date    Bleeding from varicose vein 9/15/2021    Cellulitis of right leg 8/20/2021    COVID-19 virus infection 4/7/2020    Dysphagia     Irregular heart beat     afib 2012    Right leg swelling 5/23/2022     Past Surgical History:   Procedure Laterality Date    APPENDECTOMY      CARDIAC SURGERY  03/15/2021    CARDIOVERSION      COLONOSCOPY      HERNIA REPAIR      JOINT REPLACEMENT      right knee    KNEE SURGERY  11/2015    MI ESOPHAGOGASTRODUODENOSCOPY TRANSORAL DIAGNOSTIC N/A 12/13/2016    Procedure: ESOPHAGOGASTRODUODENOSCOPY (EGD); Surgeon: Ella Lucas MD;  Location: [facility];   Service: Gastroenterology    WISDOM TOOTH EXTRACTION        Family History:     Family History   Problem Relation Age of Onset    Cancer Mother         Breast    Prostate cancer Father       Social History:     Social History     Socioeconomic History    Marital status: /Civil Union     Spouse name: None    Number of children: None    Years of education: None    Highest education level: None   Occupational History    None   Tobacco Use    Smoking status: Never Smoker    Smokeless tobacco: Never Used    Tobacco comment: Most recent tobacco use screenin2018    Vaping Use    Vaping Use: Never used   Substance and Sexual Activity    Alcohol use: No    Drug use: No    Sexual activity: Not Currently   Other Topics Concern    None   Social History Narrative    None     Social Determinants of Health     Financial Resource Strain: Not on file   Food Insecurity: Not on file   Transportation Needs: Not on file   Physical Activity: Not on file   Stress: Not on file   Social Connections: Not on file   Intimate Partner Violence: Not on file   Housing Stability: Not on file      Medications and Allergies:     Current Outpatient Medications   Medication Sig Dispense Refill    amiodarone 200 mg tablet Take 200 mg by mouth daily      apixaban (ELIQUIS) 5 mg Take 5 mg by mouth 2 (two) times a day      Apoaequorin (Prevagen) 10 MG CAPS Take by mouth      aspirin (ECOTRIN LOW STRENGTH) 81 mg EC tablet Take 81 mg by mouth daily      Calcium Carbonate-Vitamin D (CALTRATE 600+D PO) Take 1 tablet by mouth daily      lansoprazole (PREVACID) 30 mg capsule Take 1 capsule (30 mg total) by mouth daily 90 capsule 3    Multiple Vitamins-Minerals (OCUVITE PO) Take 1 tablet by mouth daily      multivitamin (THERAGRAN) TABS Take 1 tablet by mouth daily      valsartan (DIOVAN) 40 mg tablet Take 40 mg by mouth daily      Zn-Pyg Afri-Nettle-Saw Palmet (SAW PALMETTO COMPLEX PO) Take by mouth      amoxicillin (AMOXIL) 500 mg capsule take 4 capsules by mouth ONCE 30-60 MINUTES prior to dental appointment (Patient not taking: No sig reported)      prednisoLONE acetate (PRED FORTE) 1 % ophthalmic suspension INSTILL 1 DROP BY OPHTHALMIC ROUTE 4 TIMES EVERY DAY IN LEFT EYE (Patient not taking: No sig reported)       No current facility-administered medications for this visit  Allergies   Allergen Reactions    Celecoxib GI Intolerance    Dabigatran     Rivaroxaban     Rofecoxib Other (See Comments)    Sotalol      Other reaction(s): decreased blood pressure      Immunizations:     Immunization History   Administered Date(s) Administered    COVID-19 PFIZER VACCINE 0 3 ML IM 01/14/2021, 02/03/2021    Pneumococcal Conjugate 13-Valent 10/02/2015, 08/31/2020    Tdap 10/05/2017    Zoster Vaccine Recombinant 02/02/2019, 09/16/2020, 12/01/2020    influenza, trivalent, adjuvanted 09/25/2019      Health Maintenance:         Topic Date Due    Colorectal Cancer Screening  01/13/2023 (Originally 11/25/1985)         Topic Date Due    COVID-19 Vaccine (3 - Booster for Pfizer series) 07/03/2021    Pneumococcal Vaccine: 65+ Years (2 - PPSV23 or PCV20) 08/31/2021    Influenza Vaccine (1) 09/01/2022      Medicare Screening Tests and Risk Assessments:     Deisi Cornea is here for his Subsequent Wellness visit  Health Risk Assessment:   Patient rates overall health as very good  Patient feels that their physical health rating is same  Patient is very satisfied with their life  Eyesight was rated as same  Hearing was rated as same  Patient feels that their emotional and mental health rating is same  Patients states they are never, rarely angry  Patient states they are never, rarely unusually tired/fatigued  Pain experienced in the last 7 days has been none  Patient states that he has experienced no weight loss or gain in last 6 months  Depression Screening:   PHQ-2 Score: 0      Fall Risk Screening:    In the past year, patient has experienced: no history of falling in past year      Home Safety:  Patient does not have trouble with stairs inside or outside of their home  Patient has working smoke alarms and has working carbon monoxide detector  Home safety hazards include: none  Nutrition:   Current diet is Regular  Medications:   Patient is currently taking over-the-counter supplements  OTC medications include: see medication list  Patient is able to manage medications  Activities of Daily Living (ADLs)/Instrumental Activities of Daily Living (IADLs):   Walk and transfer into and out of bed and chair?: Yes  Dress and groom yourself?: Yes    Bathe or shower yourself?: Yes    Feed yourself? Yes  Do your laundry/housekeeping?: Yes  Manage your money, pay your bills and track your expenses?: Yes  Make your own meals?: Yes    Do your own shopping?: Yes    Previous Hospitalizations:   Any hospitalizations or ED visits within the last 12 months?: No      Advance Care Planning:   Living will: Yes    Durable POA for healthcare: Yes    Advanced directive: Yes      Cognitive Screening:   Provider or family/friend/caregiver concerned regarding cognition?: No    PREVENTIVE SCREENINGS      Cardiovascular Screening:    General: Screening Not Indicated and History Lipid Disorder      Diabetes Screening:     General: Screening Current      Colorectal Cancer Screening:     General: Screening Not Indicated      Prostate Cancer Screening:    General: Screening Not Indicated      Osteoporosis Screening:    General: Screening Not Indicated      Abdominal Aortic Aneurysm (AAA) Screening:        General: Screening Not Indicated      Lung Cancer Screening:     General: Screening Not Indicated    Screening, Brief Intervention, and Referral to Treatment (SBIRT)    Screening  Typical number of drinks in a day: 0  Typical number of drinks in a week: 0  Interpretation: Low risk drinking behavior      Single Item Drug Screening:  How often have you used an illegal drug (including marijuana) or a prescription medication for non-medical reasons in the past year? never    Single Item Drug Screen Score: 0  Interpretation: Negative screen for possible drug use disorder    No exam data present     Physical Exam:     /72   Pulse 65   Temp 97 6 °F (36 4 °C)   Resp 16   Ht 5' 9" (1 753 m)   Wt 85 3 kg (188 lb)   SpO2 94%   BMI 27 76 kg/m²     Physical Exam  Vitals and nursing note reviewed  Constitutional:       General: He is not in acute distress  Appearance: He is well-developed  He is not ill-appearing, toxic-appearing or diaphoretic  HENT:      Head: Normocephalic and atraumatic  Eyes:      Conjunctiva/sclera: Conjunctivae normal    Cardiovascular:      Rate and Rhythm: Normal rate and regular rhythm  Heart sounds: No murmur heard  Pulmonary:      Effort: Pulmonary effort is normal  No respiratory distress  Breath sounds: Normal breath sounds  Abdominal:      Palpations: Abdomen is soft  Tenderness: There is no abdominal tenderness  Musculoskeletal:      Cervical back: Neck supple  Right lower leg: No edema  Left lower leg: No edema  Skin:     General: Skin is warm and dry  Neurological:      Mental Status: He is alert and oriented to person, place, and time     Psychiatric:         Mood and Affect: Mood normal          Behavior: Behavior normal           Adelaide Peoples MD

## 2022-07-18 NOTE — ASSESSMENT & PLAN NOTE
Says he tried Flomax for 2 weeks in May without improvement  He goes to the bathroom at night once only  He denies trouble urinating so will not try again at this time

## 2022-10-13 ENCOUNTER — TELEPHONE (OUTPATIENT)
Dept: INTERNAL MEDICINE CLINIC | Facility: CLINIC | Age: 82
End: 2022-10-13

## 2022-10-13 DIAGNOSIS — J06.9 UPPER RESPIRATORY TRACT INFECTION, UNSPECIFIED TYPE: Primary | ICD-10-CM

## 2022-10-13 RX ORDER — AZITHROMYCIN 250 MG/1
TABLET, FILM COATED ORAL
Qty: 6 TABLET | Refills: 0 | Status: SHIPPED | OUTPATIENT
Start: 2022-10-13 | End: 2023-08-21 | Stop reason: SDUPTHER

## 2022-10-13 RX ORDER — AZITHROMYCIN 250 MG/1
TABLET, FILM COATED ORAL
Qty: 6 TABLET | Refills: 0 | Status: SHIPPED | OUTPATIENT
Start: 2022-10-13 | End: 2022-10-13 | Stop reason: SDUPTHER

## 2022-10-13 NOTE — TELEPHONE ENCOUNTER
Please advise him to take a COVID test fist because if it is COVID, he can get on the antiviral and an antibiotic will not help

## 2022-10-13 NOTE — TELEPHONE ENCOUNTER
I did sent the Venda So for him but advise him that these infections are most of the time still viral  He can take plain Robitussin or Mucinex  for the cough

## 2022-10-13 NOTE — TELEPHONE ENCOUNTER
Patient reports he started with bronchitis symptoms for  2 day has cougn, runny nose, scratchy throat and losing his voice  Stacia fever, MATIAS or SOB  Patient is requesting medication for bronchitis? Uses: [facility] - [phone]

## 2022-12-14 DIAGNOSIS — K21.9 GASTROESOPHAGEAL REFLUX DISEASE, UNSPECIFIED WHETHER ESOPHAGITIS PRESENT: ICD-10-CM

## 2022-12-14 RX ORDER — LANSOPRAZOLE 30 MG/1
CAPSULE, DELAYED RELEASE ORAL
Qty: 90 CAPSULE | Refills: 3 | Status: SHIPPED | OUTPATIENT
Start: 2022-12-14

## 2023-01-13 ENCOUNTER — TELEPHONE (OUTPATIENT)
Dept: INTERNAL MEDICINE CLINIC | Facility: CLINIC | Age: 83
End: 2023-01-13

## 2023-01-13 DIAGNOSIS — M62.838 MUSCLE SPASM: Primary | ICD-10-CM

## 2023-01-13 RX ORDER — METHOCARBAMOL 500 MG/1
500 TABLET, FILM COATED ORAL 3 TIMES DAILY PRN
Qty: 30 TABLET | Refills: 0 | Status: SHIPPED | OUTPATIENT
Start: 2023-01-13 | End: 2023-01-23 | Stop reason: SDUPTHER

## 2023-01-13 NOTE — TELEPHONE ENCOUNTER
Patient has a pinched nerve in his back  Patient is seeing the chiropractor, Dr Coty Bill  They advised the patient to call his PCP and ask for a muscle relaxer so they can continue to work with the patient  He is asking for a week supply    Pharmacy-[facility]          Please advise

## 2023-01-16 LAB
ALBUMIN SERPL-MCNC: 4.3 G/DL (ref 3.6–5.1)
ALBUMIN/GLOB SERPL: 2.2 (CALC) (ref 1–2.5)
ALP SERPL-CCNC: 61 U/L (ref 35–144)
ALT SERPL-CCNC: 30 U/L (ref 9–46)
AST SERPL-CCNC: 22 U/L (ref 10–35)
BASOPHILS # BLD AUTO: 78 CELLS/UL (ref 0–200)
BASOPHILS NFR BLD AUTO: 1.4 %
BILIRUB SERPL-MCNC: 1.1 MG/DL (ref 0.2–1.2)
BUN SERPL-MCNC: 18 MG/DL (ref 7–25)
BUN/CREAT SERPL: 14 (CALC) (ref 6–22)
CALCIUM SERPL-MCNC: 9.6 MG/DL (ref 8.6–10.3)
CHLORIDE SERPL-SCNC: 102 MMOL/L (ref 98–110)
CHOLEST SERPL-MCNC: 207 MG/DL
CHOLEST/HDLC SERPL: 4.8 (CALC)
CO2 SERPL-SCNC: 32 MMOL/L (ref 20–32)
CREAT SERPL-MCNC: 1.31 MG/DL (ref 0.7–1.22)
EOSINOPHIL # BLD AUTO: 263 CELLS/UL (ref 15–500)
EOSINOPHIL NFR BLD AUTO: 4.7 %
ERYTHROCYTE [DISTWIDTH] IN BLOOD BY AUTOMATED COUNT: 13.5 % (ref 11–15)
EST. AVERAGE GLUCOSE BLD GHB EST-MCNC: 108 MG/DL
EST. AVERAGE GLUCOSE BLD GHB EST-SCNC: 6 MMOL/L
GFR/BSA.PRED SERPLBLD CYS-BASED-ARV: 54 ML/MIN/1.73M2
GLOBULIN SER CALC-MCNC: 2 G/DL (CALC) (ref 1.9–3.7)
GLUCOSE SERPL-MCNC: 89 MG/DL (ref 65–99)
HBA1C MFR BLD: 5.4 % OF TOTAL HGB
HCT VFR BLD AUTO: 46.9 % (ref 38.5–50)
HDLC SERPL-MCNC: 43 MG/DL
HGB BLD-MCNC: 15.5 G/DL (ref 13.2–17.1)
LDLC SERPL CALC-MCNC: 138 MG/DL (CALC)
LYMPHOCYTES # BLD AUTO: 1837 CELLS/UL (ref 850–3900)
LYMPHOCYTES NFR BLD AUTO: 32.8 %
MCH RBC QN AUTO: 29.8 PG (ref 27–33)
MCHC RBC AUTO-ENTMCNC: 33 G/DL (ref 32–36)
MCV RBC AUTO: 90.2 FL (ref 80–100)
MONOCYTES # BLD AUTO: 459 CELLS/UL (ref 200–950)
MONOCYTES NFR BLD AUTO: 8.2 %
NEUTROPHILS # BLD AUTO: 2962 CELLS/UL (ref 1500–7800)
NEUTROPHILS NFR BLD AUTO: 52.9 %
NONHDLC SERPL-MCNC: 164 MG/DL (CALC)
PLATELET # BLD AUTO: 226 THOUSAND/UL (ref 140–400)
PMV BLD REES-ECKER: 11.7 FL (ref 7.5–12.5)
POTASSIUM SERPL-SCNC: 4.4 MMOL/L (ref 3.5–5.3)
PROT SERPL-MCNC: 6.3 G/DL (ref 6.1–8.1)
RBC # BLD AUTO: 5.2 MILLION/UL (ref 4.2–5.8)
SODIUM SERPL-SCNC: 139 MMOL/L (ref 135–146)
TRIGL SERPL-MCNC: 135 MG/DL
WBC # BLD AUTO: 5.6 THOUSAND/UL (ref 3.8–10.8)

## 2023-01-17 ENCOUNTER — RA CDI HCC (OUTPATIENT)
Dept: OTHER | Facility: HOSPITAL | Age: 83
End: 2023-01-17

## 2023-01-17 NOTE — PROGRESS NOTES
[facility]  coding opportunities       Chart reviewed, no opportunity found:   [address]        Patients Insurance     Medicare Insurance: [employer]

## 2023-01-23 ENCOUNTER — OFFICE VISIT (OUTPATIENT)
Dept: INTERNAL MEDICINE CLINIC | Facility: CLINIC | Age: 83
End: 2023-01-23

## 2023-01-23 VITALS
HEART RATE: 68 BPM | BODY MASS INDEX: 27.31 KG/M2 | OXYGEN SATURATION: 97 % | HEIGHT: 69 IN | DIASTOLIC BLOOD PRESSURE: 82 MMHG | SYSTOLIC BLOOD PRESSURE: 146 MMHG | WEIGHT: 184.4 LBS

## 2023-01-23 DIAGNOSIS — E78.2 MIXED HYPERLIPIDEMIA: ICD-10-CM

## 2023-01-23 DIAGNOSIS — M62.838 MUSCLE SPASM: Primary | ICD-10-CM

## 2023-01-23 DIAGNOSIS — I42.0 DCM (DILATED CARDIOMYOPATHY) (HCC): ICD-10-CM

## 2023-01-23 DIAGNOSIS — I10 HYPERTENSION, ESSENTIAL, BENIGN: ICD-10-CM

## 2023-01-23 DIAGNOSIS — N18.31 STAGE 3A CHRONIC KIDNEY DISEASE (HCC): ICD-10-CM

## 2023-01-23 DIAGNOSIS — I48.0 PAROXYSMAL A-FIB (HCC): ICD-10-CM

## 2023-01-23 DIAGNOSIS — R29.6 RECURRENT FALLS: ICD-10-CM

## 2023-01-23 DIAGNOSIS — K21.9 GASTROESOPHAGEAL REFLUX DISEASE, UNSPECIFIED WHETHER ESOPHAGITIS PRESENT: ICD-10-CM

## 2023-01-23 RX ORDER — METHOCARBAMOL 500 MG/1
500 TABLET, FILM COATED ORAL 3 TIMES DAILY PRN
Qty: 30 TABLET | Refills: 1 | Status: SHIPPED | OUTPATIENT
Start: 2023-01-23

## 2023-01-23 NOTE — ASSESSMENT & PLAN NOTE
Lab Results   Component Value Date    EGFR 54 (L) 01/16/2023    EGFR 59 (L) 07/11/2022    CREATININE 1 31 (H) 01/16/2023    CREATININE 1 23 (H) 07/11/2022    CREATININE 1 14 (H) 01/26/2022   Stable

## 2023-01-23 NOTE — PROGRESS NOTES
Assessment/Plan:    Gastroesophageal reflux disease  Wean off lansoprazole    Paroxysmal A-fib ([facility] [address] )  He is maintained on Eliquis     Stage 3a chronic kidney disease ([facility] [address] )  Lab Results   Component Value Date    EGFR 54 (L) 01/16/2023    EGFR 59 (L) 07/11/2022    CREATININE 1 31 (H) 01/16/2023    CREATININE 1 23 (H) 07/11/2022    CREATININE 1 14 (H) 01/26/2022   Stable    Recurrent falls  Declined PT    Advised to bring vaccine card so list can be updated-already had COVID boosters,  flu and Prevnar 20 vaccines     Problem List Items Addressed This Visit        Digestive    Gastroesophageal reflux disease     Wean off lansoprazole            Cardiovascular and Mediastinum    Hypertension, essential, benign    Relevant Orders    Basic metabolic panel    CBC and Platelet    Paroxysmal A-fib ([facility] [address] )     He is maintained on Eliquis             Genitourinary    Stage 3a chronic kidney disease ([facility] [address] )     Lab Results   Component Value Date    EGFR 54 (L) 01/16/2023    EGFR 59 (L) 07/11/2022    CREATININE 1 31 (H) 01/16/2023    CREATININE 1 23 (H) 07/11/2022    CREATININE 1 14 (H) 01/26/2022   Stable         Relevant Orders    Basic metabolic panel    CBC and Platelet       Other    Mixed hyperlipidemia    Relevant Orders    Lipid Panel with Direct LDL reflex    Recurrent falls     Declined PT        Other Visit Diagnoses     Muscle spasm    -  Primary    Relevant Medications    methocarbamol (ROBAXIN) 500 mg tablet    DCM (dilated cardiomyopathy) ([facility])                Subjective:      Patient ID: Lolita Araiza  is a 79 y o  male  HPI  [employer] a few weeks ago tripping on a tree branch  He experienced pain down the left leg and saw his chiropractor who recommended a muscle relaxant  He has been taking Robaxin TID, using TENS unit  He had another fall a week later ,tripped again  Threw back out the other day when he twisted the wrong way  ER visit in October for right knee pain after a fall   Tripped on the rope on the ground  Currently, there is pain in the right lower back that is not radiating to the LE  Recent labs reviewed and stable-creatinine, lipids elevated    The following portions of the patient's history were reviewed and updated as appropriate: allergies, current medications, past family history, past medical history, past social history, past surgical history and problem list     Review of Systems   Constitutional: Positive for unexpected weight change (weight loss from eating only twice a day and eating healthier)  Respiratory: Negative for shortness of breath  Cardiovascular: Negative for chest pain, palpitations and leg swelling  Gastrointestinal: Negative for abdominal pain, constipation and diarrhea  Genitourinary: Negative for difficulty urinating  Musculoskeletal: Positive for back pain (left sided)  Neurological: Negative for dizziness and headaches  Objective:      /82   Pulse 68   Ht 5' 9" (1 753 m)   Wt 83 6 kg (184 lb 6 4 oz)   SpO2 97%   BMI 27 23 kg/m²          Physical Exam  Constitutional:       General: He is not in acute distress  Appearance: He is well-developed  He is not ill-appearing, toxic-appearing or diaphoretic  HENT:      Ears:      Comments: Scab left outer ear  Eyes:      Conjunctiva/sclera: Conjunctivae normal    Cardiovascular:      Rate and Rhythm: Normal rate and regular rhythm  Heart sounds: Normal heart sounds  No murmur heard  Pulmonary:      Effort: Pulmonary effort is normal  No respiratory distress  Breath sounds: Normal breath sounds  No wheezing or rales  Abdominal:      General: There is no distension  Palpations: Abdomen is soft  There is no mass  Tenderness: There is no abdominal tenderness  There is no guarding or rebound  Musculoskeletal:      Cervical back: Neck supple  Right lower leg: No edema  Left lower leg: No edema     Neurological:      Mental Status: He is alert and oriented to person, place, and time  Gait: Gait abnormal    Psychiatric:         Mood and Affect: Mood normal          Behavior: Behavior normal          Thought Content:  Thought content normal          Judgment: Judgment normal

## 2023-06-28 ENCOUNTER — ESTABLISHED COMPREHENSIVE EXAM (OUTPATIENT)
Dept: URBAN - METROPOLITAN AREA CLINIC 6 | Facility: CLINIC | Age: 83
End: 2023-06-28

## 2023-06-28 DIAGNOSIS — H18.893: ICD-10-CM

## 2023-06-28 DIAGNOSIS — H33.012: ICD-10-CM

## 2023-06-28 DIAGNOSIS — Z96.1: ICD-10-CM

## 2023-06-28 DIAGNOSIS — H52.7: ICD-10-CM

## 2023-06-28 DIAGNOSIS — H35.411: ICD-10-CM

## 2023-06-28 PROCEDURE — 92014 COMPRE OPH EXAM EST PT 1/>: CPT

## 2023-06-28 PROCEDURE — 92015 DETERMINE REFRACTIVE STATE: CPT

## 2023-06-28 ASSESSMENT — TONOMETRY
OS_IOP_MMHG: 10
OD_IOP_MMHG: 14

## 2023-06-28 ASSESSMENT — VISUAL ACUITY
OD_CC: 20/30
OU_CC: J2
OS_CC: 20/50

## 2023-06-30 LAB
BASOPHILS # BLD AUTO: 102 CELLS/UL (ref 0–200)
BASOPHILS NFR BLD AUTO: 1.6 %
BUN SERPL-MCNC: 22 MG/DL (ref 7–25)
BUN/CREAT SERPL: NORMAL (CALC) (ref 6–22)
CALCIUM SERPL-MCNC: 9.3 MG/DL (ref 8.6–10.3)
CHLORIDE SERPL-SCNC: 107 MMOL/L (ref 98–110)
CHOLEST SERPL-MCNC: 184 MG/DL
CHOLEST/HDLC SERPL: 4.3 (CALC)
CO2 SERPL-SCNC: 27 MMOL/L (ref 20–32)
CREAT SERPL-MCNC: 1.15 MG/DL (ref 0.7–1.22)
EOSINOPHIL # BLD AUTO: 256 CELLS/UL (ref 15–500)
EOSINOPHIL NFR BLD AUTO: 4 %
ERYTHROCYTE [DISTWIDTH] IN BLOOD BY AUTOMATED COUNT: 13.6 % (ref 11–15)
GFR/BSA.PRED SERPLBLD CYS-BASED-ARV: 63 ML/MIN/1.73M2
GLUCOSE SERPL-MCNC: 96 MG/DL (ref 65–99)
HCT VFR BLD AUTO: 44.6 % (ref 38.5–50)
HDLC SERPL-MCNC: 43 MG/DL
HGB BLD-MCNC: 15 G/DL (ref 13.2–17.1)
LDLC SERPL CALC-MCNC: 120 MG/DL (CALC)
LYMPHOCYTES # BLD AUTO: 1786 CELLS/UL (ref 850–3900)
LYMPHOCYTES NFR BLD AUTO: 27.9 %
MCH RBC QN AUTO: 30.4 PG (ref 27–33)
MCHC RBC AUTO-ENTMCNC: 33.6 G/DL (ref 32–36)
MCV RBC AUTO: 90.5 FL (ref 80–100)
MONOCYTES # BLD AUTO: 595 CELLS/UL (ref 200–950)
MONOCYTES NFR BLD AUTO: 9.3 %
NEUTROPHILS # BLD AUTO: 3661 CELLS/UL (ref 1500–7800)
NEUTROPHILS NFR BLD AUTO: 57.2 %
NONHDLC SERPL-MCNC: 141 MG/DL (CALC)
PLATELET # BLD AUTO: 219 THOUSAND/UL (ref 140–400)
PMV BLD REES-ECKER: 11.8 FL (ref 7.5–12.5)
POTASSIUM SERPL-SCNC: 4.3 MMOL/L (ref 3.5–5.3)
RBC # BLD AUTO: 4.93 MILLION/UL (ref 4.2–5.8)
SODIUM SERPL-SCNC: 141 MMOL/L (ref 135–146)
TRIGL SERPL-MCNC: 100 MG/DL
WBC # BLD AUTO: 6.4 THOUSAND/UL (ref 3.8–10.8)

## 2023-07-22 ENCOUNTER — TELEPHONE (OUTPATIENT)
Dept: OTHER | Facility: OTHER | Age: 83
End: 2023-07-22

## 2023-07-22 NOTE — TELEPHONE ENCOUNTER
Patient is calling regarding cancelling an appointment. Date/Time: 07/22/2023 /12:45 p.m.     Patient was rescheduled: YES [] NO [x]    Patient requesting call back to reschedule: YES [] NO [x]

## 2023-07-25 ENCOUNTER — RA CDI HCC (OUTPATIENT)
Dept: OTHER | Facility: HOSPITAL | Age: 83
End: 2023-07-25

## 2023-07-25 NOTE — PROGRESS NOTES
[address] [facility] coding opportunities       Chart reviewed, no opportunity found: CHART REVIEWED, NO OPPORTUNITY FOUND        Patients Insurance     [facility]ic[facility]

## 2023-07-28 ENCOUNTER — OFFICE VISIT (OUTPATIENT)
Dept: INTERNAL MEDICINE CLINIC | Facility: CLINIC | Age: 83
End: 2023-07-28
Payer: COMMERCIAL

## 2023-07-28 VITALS
SYSTOLIC BLOOD PRESSURE: 120 MMHG | BODY MASS INDEX: 26.54 KG/M2 | HEART RATE: 59 BPM | DIASTOLIC BLOOD PRESSURE: 64 MMHG | OXYGEN SATURATION: 99 % | HEIGHT: 69 IN | WEIGHT: 179.2 LBS

## 2023-07-28 DIAGNOSIS — Z00.00 MEDICARE ANNUAL WELLNESS VISIT, SUBSEQUENT: Primary | ICD-10-CM

## 2023-07-28 DIAGNOSIS — E78.2 MIXED HYPERLIPIDEMIA: ICD-10-CM

## 2023-07-28 DIAGNOSIS — I10 HYPERTENSION, ESSENTIAL, BENIGN: ICD-10-CM

## 2023-07-28 DIAGNOSIS — I87.2 VENOUS STASIS DERMATITIS OF RIGHT LOWER EXTREMITY: ICD-10-CM

## 2023-07-28 PROCEDURE — G0439 PPPS, SUBSEQ VISIT: HCPCS | Performed by: INTERNAL MEDICINE

## 2023-07-28 NOTE — PROGRESS NOTES
Assessment and Plan:     Problem List Items Addressed This Visit        Cardiovascular and Mediastinum    Hypertension, essential, benign    Relevant Orders    CBC and differential    Comprehensive metabolic panel       Musculoskeletal and Integument    Venous stasis dermatitis of right lower extremity     Chronic redness in the RLE from venous stasis  Advised to continue compression stockings            Other    Mixed hyperlipidemia    Relevant Orders    Comprehensive metabolic panel    Lipid panel   Other Visit Diagnoses     Medicare annual wellness visit, subsequent    -  Primary        BMI Counseling: Body mass index is 26.46 kg/m². The BMI is above normal. Nutrition recommendations include reducing intake of saturated and trans fat. Exercise recommendations include exercising 3-5 times per week. Rationale for BMI follow-up plan is due to patient being overweight or obese. Depression Screening and Follow-up Plan: Patient was screened for depression during today's encounter. They screened negative with a PHQ-2 score of 0. Preventive health issues were discussed with patient, and age appropriate screening tests were ordered as noted in patient's After Visit Summary. Personalized health advice and appropriate referrals for health education or preventive services given if needed, as noted in patient's After Visit Summary. History of Present Illness:     Patient presents for a Medicare Wellness Visit    HPI   Patient Care Team:  Eunice Novak MD as PCP - General (Internal Medicine)  Eunice Novak MD as PCP - [address] [place] [address] ([facility])  Jeffrey Pack MD     Review of Systems:     Review of Systems   Constitutional: Negative for unexpected weight change. Respiratory: Negative for shortness of breath. Cardiovascular: Positive for leg swelling (chronic and uses compression stockings). Negative for chest pain and palpitations. Gastrointestinal: Negative for abdominal pain and constipation.    Skin: Positive for color change (chronic redness right calf). Neurological: Negative for dizziness and headaches. 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   • Mixed hyperlipidemia   • Recurrent falls   • Stage 3a chronic kidney disease (HCC)   • Venous stasis dermatitis of right lower extremity      Past Medical and Surgical History:     Past Medical History:   Diagnosis Date   • Bleeding from varicose vein 9/15/2021   • Cellulitis of right leg 2021   • COVID-19 virus infection 2020   • Dysphagia    • Irregular heart beat     afib    • Right leg swelling 2022     Past Surgical History:   Procedure Laterality Date   • APPENDECTOMY     • CARDIAC SURGERY  03/15/2021   • CARDIOVERSION     • COLONOSCOPY     • HERNIA REPAIR     • JOINT REPLACEMENT      right knee   • KNEE SURGERY  2015   • AR ESOPHAGOGASTRODUODENOSCOPY TRANSORAL DIAGNOSTIC N/A 2016    Procedure: ESOPHAGOGASTRODUODENOSCOPY (EGD); Surgeon: Sohan Yusuf MD;  Location: [facility]; Service: Gastroenterology   • WISDOM TOOTH EXTRACTION        Family History:     Family History   Problem Relation Age of Onset   • Cancer Mother         Breast   • Prostate cancer Father       Social History:     Social History     Socioeconomic History   • Marital status:       Spouse name: None   • Number of children: None   • Years of education: None   • Highest education level: None   Occupational History   • None   Tobacco Use   • Smoking status: Never   • Smokeless tobacco: Never   • Tobacco comments:     Most recent tobacco use screenin2018    Vaping Use   • Vaping Use: Never used   Substance and Sexual Activity   • Alcohol use: No   • Drug use: No   • Sexual activity: Not Currently   Other Topics Concern   • None   Social History Narrative   • None     Social Determinants of Health     Financial Resource Strain: Low Risk  (7/28/2023)    Overall Financial Resource Strain (CARDIA)    • Difficulty of Paying Living Expenses: Not hard at all   Recent Concern: Financial Resource Strain - Medium Risk (7/17/2023)    Overall Financial Resource Strain (CARDIA)    • Difficulty of Paying Living Expenses: Somewhat hard   Food Insecurity: Not on file   Transportation Needs: No Transportation Needs (7/28/2023)    PRAPARE - Transportation    • Lack of Transportation (Medical): No    • Lack of Transportation (Non-Medical): No   Physical Activity: Not on file   Stress: Not on file   Social Connections: Not on file   Intimate Partner Violence: Not on file   Housing Stability: Not on file      Medications and Allergies:     Current Outpatient Medications   Medication Sig Dispense Refill   • amiodarone 200 mg tablet Take 200 mg by mouth daily     • apixaban (ELIQUIS) 5 mg Take 5 mg by mouth 2 (two) times a day     • Apoaequorin (Prevagen) 10 MG CAPS Take by mouth     • Calcium Carbonate-Vitamin D (CALTRATE 600+D PO) Take 1 tablet by mouth daily     • Diclofenac Sodium (VOLTAREN) 1 % apply 2 grams to affected area four times a day AS NEEDED FOR PAIN     • lansoprazole (PREVACID) 30 mg capsule take 1 capsule by mouth once daily 90 capsule 3   • Multiple Vitamins-Minerals (OCUVITE PO) Take 1 tablet by mouth daily     • multivitamin (THERAGRAN) TABS Take 1 tablet by mouth daily     • valsartan (DIOVAN) 40 mg tablet Take 40 mg by mouth daily     • Zn-Pyg Afri-Nettle-Saw Palmet (SAW PALMETTO COMPLEX PO) Take by mouth     • amoxicillin (AMOXIL) 500 mg capsule take 4 capsules by mouth ONCE 30-60 MINUTES prior to dental appointment (Patient not taking: No sig reported)       No current facility-administered medications for this visit. Allergies   Allergen Reactions   • Celecoxib GI Intolerance   • Dabigatran GI Intolerance   • Rivaroxaban GI Intolerance   • Rofecoxib Other (See Comments)   • Sotalol      Other reaction(s): decreased blood pressure      Immunizations:     Immunization History   Administered Date(s) Administered   • COVID-19 PFIZER VACCINE 0.3 ML IM 01/14/2021, 02/03/2021   • INFLUENZA 08/31/2020   • Pneumococcal Conjugate 13-Valent 10/02/2015, 08/31/2020   • Tdap 10/05/2017   • Zoster Vaccine Recombinant 02/02/2019, 09/16/2020, 12/01/2020   • influenza, trivalent, adjuvanted 09/25/2019      Health Maintenance:         Topic Date Due   • Colorectal Cancer Screening  01/23/2024 (Originally 11/25/1985)         Topic Date Due   • Pneumococcal Vaccine: 65+ Years (2 - PPSV23 if available, else PCV20) 10/26/2020   • COVID-19 Vaccine (3 - Pfizer series) 03/31/2021   • Influenza Vaccine (1) 09/01/2023      Medicare Screening Tests and Risk Assessments:     Jennifer Spencer is here for his Subsequent Wellness visit. Health Risk Assessment:   Patient rates overall health as very good. Patient feels that their physical health rating is same. Patient is very satisfied with their life. Eyesight was rated as same. Hearing was rated as same. Patient feels that their emotional and mental health rating is same. Patients states they are never, rarely angry. Patient states they are never, rarely unusually tired/fatigued. Pain experienced in the last 7 days has been none. Patient states that he has experienced no weight loss or gain in last 6 months. Depression Screening:   PHQ-2 Score: 0      Fall Risk Screening: In the past year, patient has experienced: no history of falling in past year      Home Safety:  Patient does not have trouble with stairs inside or outside of their home. Patient has working smoke alarms and has working carbon monoxide detector. Home safety hazards include: none. Nutrition:   Current diet is Regular. Medications:   Patient is currently taking over-the-counter supplements. OTC medications include: see medication list. Patient is able to manage medications. Activities of Daily Living (ADLs)/Instrumental Activities of Daily Living (IADLs):   Walk and transfer into and out of bed and chair?: Yes  Dress and groom yourself?: Yes    Bathe or shower yourself?: Yes    Feed yourself? Yes  Do your laundry/housekeeping?: Yes  Manage your money, pay your bills and track your expenses?: Yes  Make your own meals?: Yes    Do your own shopping?: Yes    Previous Hospitalizations:   Any hospitalizations or ED visits within the last 12 months?: No      Advance Care Planning:   Living will: Yes    Durable POA for healthcare: Yes    Advanced directive: Yes      Cognitive Screening:   Provider or family/friend/caregiver concerned regarding cognition?: No    PREVENTIVE SCREENINGS      Cardiovascular Screening:    General: Screening Not Indicated and History Lipid Disorder      Diabetes Screening:     General: Screening Current      Colorectal Cancer Screening:     General: Screening Not Indicated      Prostate Cancer Screening:    General: Screening Not Indicated      Osteoporosis Screening:    General: Screening Not Indicated      Abdominal Aortic Aneurysm (AAA) Screening:        General: Screening Not Indicated      Lung Cancer Screening:     General: Screening Not Indicated      Hepatitis C Screening:    General: Screening Not Indicated    Screening, Brief Intervention, and Referral to Treatment (SBIRT)    Screening      Single Item Drug Screening:  How often have you used an illegal drug (including marijuana) or a prescription medication for non-medical reasons in the past year? never    Single Item Drug Screen Score: 0  Interpretation: Negative screen for possible drug use disorder    No results found.      Physical Exam:     /64 (BP Location: Left arm, Patient Position: Sitting, Cuff Size: Standard)   Pulse 59   Ht 5' 9" (1.753 m)   Wt 81.3 kg (179 lb 3.2 oz)   SpO2 99%   BMI 26.46 kg/m²     Physical Exam  Vitals and nursing note reviewed. Constitutional:       General: He is not in acute distress. Appearance: He is well-developed. He is not ill-appearing, toxic-appearing or diaphoretic. Eyes:      Conjunctiva/sclera: Conjunctivae normal.   Cardiovascular:      Rate and Rhythm: Normal rate and regular rhythm. Heart sounds: No murmur heard. Comments: Large varicose veins in both LE  Pulmonary:      Effort: Pulmonary effort is normal. No respiratory distress. Breath sounds: Normal breath sounds. Abdominal:      Palpations: Abdomen is soft. Tenderness: There is no abdominal tenderness. Musculoskeletal:      Cervical back: Neck supple. Right lower leg: Edema (trace) present. Left lower leg: Edema (trace) present. Skin:     Findings: Erythema (mild erythema in the lower right leg, not tender or warm) present. Neurological:      Mental Status: He is alert.    Psychiatric:         Mood and Affect: Mood normal.         Behavior: Behavior normal.          Charly King MD

## 2023-08-21 ENCOUNTER — TELEPHONE (OUTPATIENT)
Dept: INTERNAL MEDICINE CLINIC | Facility: CLINIC | Age: 83
End: 2023-08-21

## 2023-08-21 DIAGNOSIS — J06.9 UPPER RESPIRATORY TRACT INFECTION, UNSPECIFIED TYPE: ICD-10-CM

## 2023-08-21 RX ORDER — AZITHROMYCIN 250 MG/1
TABLET, FILM COATED ORAL
Qty: 6 TABLET | Refills: 0 | Status: SHIPPED | OUTPATIENT
Start: 2023-08-21 | End: 2023-08-25

## 2023-08-21 NOTE — TELEPHONE ENCOUNTER
Pt calling in requesting a zpack please be sent into [facility], says you do this for him yearly around this time because he gets bronchitis.  He has runny nose, cough, however nothing is coming up, his voice is deep, no fever

## 2023-08-21 NOTE — TELEPHONE ENCOUNTER
Patient called again stating he called this morning and has not heard anything back. Patient upset stating it's a good thing he isn't on his death bed. Informed patient that note was sent to provider just waiting on provider response.

## 2023-08-21 NOTE — TELEPHONE ENCOUNTER
I did not receive any urgent message about this. I think he is requesting a Jose Coleman because there is a refill request for it.  I sent it over to [facility]

## 2023-12-11 ENCOUNTER — TELEPHONE (OUTPATIENT)
Age: 83
End: 2023-12-11

## 2023-12-11 DIAGNOSIS — J20.9 ACUTE BRONCHITIS, UNSPECIFIED ORGANISM: Primary | ICD-10-CM

## 2023-12-11 RX ORDER — AZITHROMYCIN 250 MG/1
TABLET, FILM COATED ORAL
Qty: 6 TABLET | Refills: 0 | Status: SHIPPED | OUTPATIENT
Start: 2023-12-11 | End: 2023-12-15

## 2023-12-11 NOTE — TELEPHONE ENCOUNTER
Daily Progress Note   Ailyn Huertas MD      2020    Chief Complaint   Patient presents with    Follow-up     yearly f/u for aortic iliac stent          HISTORY OF PRESENT ILLNESS:                The patient is a 79 y.o. female who presents with an office visit for AAA and aorta iliac scan. Today's scan shows minimal change from her scan of last year. Her AAA has stayed the same from 3.08 cm to 3.09 cm. She reports no new problems. Past Medical History:   Diagnosis Date    Anxiety     Arthritis     Cancer ([facility] [address]     2 FACIAL BASAL CELL CARCINOMAS    Celiac sprue     Centrilobular emphysema ([facility] [address] 10/8/2018    MILD    Depression 2016    Essential hypertension 2019    Osteoarthritis     Osteoporosis     Tobacco abuse        Past Surgical History:   Procedure Laterality Date     SECTION      COLONOSCOPY      COLONOSCOPY N/A 12/3/2018    [facility]    LEFT INGUINAL HERNIA REPAIR WITH MESH performed by Loyd Hdez MD at [facility] History     Socioeconomic History    Marital status:      Spouse name: Not on file    Number of children: 3    Years of education: Not on file    Highest education level: Not on file   Occupational History    Occupation: RETIRED   Social Needs    Financial resource strain: Not very hard    Food insecurity     Worry: Never true     Inability: Never true    Transportation needs     Medical: No     Non-medical: No   Tobacco Use    Smoking status: Current Every Day Smoker     Packs/day: 0.75     Years: 45.00     Pack years: 33.75     Types: Cigarettes     Start date: 1959    Smokeless tobacco: Never Used    Tobacco comment: on wellbutrin  cutting down-8-10 CIGARETTES/DAY   Substance and Sexual Activity    Alcohol use:  Yes     Alcohol/week: 0.0 standard drinks     Comment: rarely Patient is requesting a script to be called in for his symptoms of bronchitis. He does not want to schedule a visit to be seen.  He states he knows what he has and only wants a script for Z-pack to be sent to his local pharmacy  Drug use: Never    Sexual activity: Not Currently     Partners: Male   Lifestyle    Physical activity     Days per week: Not on file     Minutes per session: Not on file    Stress: Not on file   Relationships    Social connections     Talks on phone: Not on file     Gets together: Not on file     Attends [employer] service: Not on file     Active member of club or organization: Not on file     Attends meetings of clubs or organizations: Not on file     Relationship status: Not on file    Intimate partner violence     Fear of current or ex partner: Not on file     Emotionally abused: Not on file     Physically abused: Not on file     Forced sexual activity: Not on file   Other Topics Concern    Not on file   Social History Narrative    Not on file       Family History   Problem Relation Age of Onset    Stroke Father     Other Mother         Tourettes  &  from accident         Current Outpatient Medications:     buPROPion (WELLBUTRIN SR) 150 MG extended release tablet, Take 1 tablet by mouth daily, Disp: 60 tablet, Rfl: 1    amLODIPine (NORVASC) 5 MG tablet, TAKE ONE TABLET BY MOUTH DAILY, Disp: 90 tablet, Rfl: 1    dorzolamide (TRUSOPT) 2 % ophthalmic solution, Place 1 drop into the right eye 2 times daily, Disp: , Rfl:     Calcium Citrate-Vitamin D (CALCIUM + D PO), Take 1 tablet by mouth daily, Disp: , Rfl:     beta carotene 15 MG capsule, Take 15 mg by mouth daily , Disp: , Rfl:     Glucosamine-Chondroitin--250-250 MG CAPS, Take by mouth daily, Disp: , Rfl:     Multiple Vitamins-Iron (MULTIVITAMIN/IRON PO), Take by mouth daily, Disp: , Rfl:     Loratadine-Pseudoephedrine (CLARITIN-D 12 HOUR PO), Take 1 tablet by mouth as needed , Disp: , Rfl:     b complex vitamins capsule, Take 1 capsule by mouth daily, Disp: , Rfl:     Cholecalciferol (VITAMIN D3) 2000 units CAPS, Take by mouth daily, Disp: , Rfl:     albuterol sulfate HFA (PROVENTIL HFA) 108 (90 Base) MCG/ACT inhaler, Inhale 2 g/dL Final    Cholesterol, Total 06/04/2020 181  0 - 199 mg/dL Final    Triglycerides 06/04/2020 63  0 - 150 mg/dL Final    HDL 06/04/2020 59  40 - 60 mg/dL Final    LDL Calculated 06/04/2020 109* <100 mg/dL Final    VLDL Cholesterol Calculated 06/04/2020 13  Not Established mg/dL Final    WBC 06/04/2020 4.2  4.0 - 11.0 K/uL Final    RBC 06/04/2020 4.44  4.00 - 5.20 M/uL Final    Hemoglobin 06/04/2020 13.8  12.0 - 16.0 g/dL Final    Hematocrit 06/04/2020 41.1  36.0 - 48.0 % Final    MCV 06/04/2020 92.4  80.0 - 100.0 fL Final    MCH 06/04/2020 31.1  26.0 - 34.0 pg Final    MCHC 06/04/2020 33.7  31.0 - 36.0 g/dL Final    RDW 06/04/2020 12.7  12.4 - 15.4 % Final    Platelets 11/48/0735 216  135 - 450 K/uL Final    MPV 06/04/2020 7.2  5.0 - 10.5 fL Final    Neutrophils % 06/04/2020 56.4  % Final    Lymphocytes % 06/04/2020 31.8  % Final    Monocytes % 06/04/2020 9.5  % Final    Eosinophils % 06/04/2020 1.2  % Final    Basophils % 06/04/2020 1.1  % Final    Neutrophils Absolute 06/04/2020 2.4  1.7 - 7.7 K/uL Final    Lymphocytes Absolute 06/04/2020 1.3  1.0 - 5.1 K/uL Final    Monocytes Absolute 06/04/2020 0.4  0.0 - 1.3 K/uL Final    Eosinophils Absolute 06/04/2020 0.1  0.0 - 0.6 K/uL Final    Basophils Absolute 06/04/2020 0.0  0.0 - 0.2 K/uL Final       Review of Systems   Constitutional: Negative. HENT: Negative. Eyes: Positive for visual disturbance ( wears glasses). Respiratory: Negative. Cardiovascular: Positive for leg swelling. Gastrointestinal: Negative. All other systems reviewed and are negative. Physical Exam  Constitutional:       General: She is not in acute distress. Appearance: She is well-developed. She is not ill-appearing or toxic-appearing. HENT:      Head: Normocephalic and atraumatic. Right Ear: External ear normal.      Left Ear: External ear normal.   Eyes:      General: No scleral icterus.      Pupils: Pupils are equal, round, and reactive adenopathy. Left side of head: No submandibular, preauricular, posterior auricular or occipital adenopathy. Cervical: No cervical adenopathy. Right cervical: No superficial, deep or posterior cervical adenopathy. Left cervical: No superficial, deep or posterior cervical adenopathy. Upper Body:      Right upper body: No supraclavicular or pectoral adenopathy. Left upper body: No supraclavicular or pectoral adenopathy. Lower Body: No right inguinal adenopathy. No left inguinal adenopathy. Skin:     General: Skin is warm and dry. Coloration: Skin is not pale. Findings: No bruising, erythema, laceration, lesion or rash. Neurological:      Mental Status: She is oriented to person, place, and time. Cranial Nerves: No cranial nerve deficit. Sensory: No sensory deficit. Motor: No atrophy or abnormal muscle tone. Coordination: Coordination normal.      Gait: Gait normal.   Psychiatric:         Speech: Speech normal.         Behavior: Behavior normal.         Thought Content: Thought content normal.         Judgment: Judgment normal.           ASSESSMENT:  She had a hernia repaired by Dr. Cait Lucas. Left side      Problem List Items Addressed This Visit     Abdominal aortic aneurysm (AAA) without rupture ([address]      Other Visit Diagnoses     AAA (abdominal aortic aneurysm) without rupture ([address]    -  Primary        Her popliteals feel enlarged next year we will get them scanned as well  PLAN:    Ms. Matti Manjarrez should return in one year for an aorta iliac scan and bilateral lower extremity arterial duplex and an office visit. Donna Hastings MA am scribing for and in the presence of Viet Fajardo MD on this date of 08/25/20  I Claudia Calixto MD personally performed the services described in this documentation as scribed by the Medical Assistant Berlin Treadwell in my presence and it is both accurate and complete.         Electronically signed by Viet Fajardo MD on

## 2023-12-11 NOTE — TELEPHONE ENCOUNTER
Patient called back to see if prescription was sent to the pharmacy. Patient was advised that prescription was sent to [facility] at 11:33am as per the E-prescribing status.

## 2023-12-11 NOTE — TELEPHONE ENCOUNTER
Pt called in again asking why his request for a z-pack didn't get filled, I explained that Dr. Samantha Garcia has been with pts this morning and she'll get to it when she can. Please call to confirm when/if the script is sent to the pharmacy. Thank you!

## 2024-01-02 ENCOUNTER — TELEPHONE (OUTPATIENT)
Age: 84
End: 2024-01-02

## 2024-01-02 ENCOUNTER — TELEMEDICINE (OUTPATIENT)
Dept: INTERNAL MEDICINE CLINIC | Facility: CLINIC | Age: 84
End: 2024-01-02
Payer: COMMERCIAL

## 2024-01-02 DIAGNOSIS — J02.9 SORE THROAT: Primary | ICD-10-CM

## 2024-01-02 PROCEDURE — 99213 OFFICE O/P EST LOW 20 MIN: CPT | Performed by: INTERNAL MEDICINE

## 2024-01-02 RX ORDER — AZITHROMYCIN 250 MG/1
TABLET, FILM COATED ORAL DAILY
Qty: 6 TABLET | Refills: 0 | Status: SHIPPED | OUTPATIENT
Start: 2024-01-02 | End: 2024-01-07

## 2024-01-02 NOTE — ASSESSMENT & PLAN NOTE
Pt requesting Zpack which he has had before.  Patient did a COVID test which was negative.  Discussed with patient further evaluation if not improving.  Patient can continue with warm salt water gargles.

## 2024-01-02 NOTE — PROGRESS NOTES
Virtual Regular Visit    Verification of patient location:    Patient is located at Home in the following state in which I hold an active license [place]      Assessment/Plan:    Problem List Items Addressed This Visit        Other    Sore throat - Primary     Pt requesting Zpack which he has had before.  Patient did a COVID test which was negative.  Discussed with patient further evaluation if not improving.  Patient can continue with warm salt water gargles.         Relevant Medications    azithromycin (Zithromax) 250 mg tablet            Reason for visit is   Chief Complaint   Patient presents with   • Virtual Regular Visit          Encounter provider Edgar Last MD    Provider located at [facility]  [address] [place] [address] [place] [id number]      Recent Visits  No visits were found meeting these conditions.  Showing recent visits within past 7 days and meeting all other requirements  Today's Visits  Date Type Provider Dept   01/02/24 Telemedicine Edgar Last MD  [facility]   Showing today's visits and meeting all other requirements  Future Appointments  No visits were found meeting these conditions.  Showing future appointments within next 150 days and meeting all other requirements       The patient was identified by name and date of birth. Rashawn Jackson Jr. was informed that this is a telemedicine visit and that the visit is being conducted through the Epic Embedded platform. He agrees to proceed..  My office door was closed. No one else was in the room.  He acknowledged consent and understanding of privacy and security of the video platform. The patient has agreed to participate and understands they can discontinue the visit at any time.    Patient is aware this is a billable service.     Subjective  Rashawn Jackson Jr. is a 83 y.o. male  .      Onset of symptoms 3 days ago of laryngitis, cough,          Past Medical History:   Diagnosis Date   • Bleeding  from varicose vein 9/15/2021   • Cellulitis of right leg 8/20/2021   • COVID-19 virus infection 4/7/2020   • Dysphagia    • Irregular heart beat     afib 2012   • Right leg swelling 5/23/2022       Past Surgical History:   Procedure Laterality Date   • APPENDECTOMY     • CARDIAC SURGERY  03/15/2021   • CARDIOVERSION     • COLONOSCOPY     • HERNIA REPAIR     • JOINT REPLACEMENT      right knee   • KNEE SURGERY  11/2015   • OR ESOPHAGOGASTRODUODENOSCOPY TRANSORAL DIAGNOSTIC N/A 12/13/2016    Procedure: ESOPHAGOGASTRODUODENOSCOPY (EGD);  Surgeon: Jaxon Willett MD;  Location:  GI LAB;  Service: Gastroenterology   • WISDOM TOOTH EXTRACTION         Current Outpatient Medications   Medication Sig Dispense Refill   • amiodarone 200 mg tablet Take 200 mg by mouth daily     • apixaban (ELIQUIS) 5 mg Take 5 mg by mouth 2 (two) times a day     • Apoaequorin (Prevagen) 10 MG CAPS Take by mouth     • azithromycin (Zithromax) 250 mg tablet Take 2 tablets (500 mg total) by mouth daily for 1 day, THEN 1 tablet (250 mg total) daily for 4 days. 6 tablet 0   • Calcium Carbonate-Vitamin D (CALTRATE 600+D PO) Take 1 tablet by mouth daily     • Diclofenac Sodium (VOLTAREN) 1 % apply 2 grams to affected area four times a day AS NEEDED FOR PAIN     • lansoprazole (PREVACID) 30 mg capsule take 1 capsule by mouth once daily 90 capsule 3   • Multiple Vitamins-Minerals (OCUVITE PO) Take 1 tablet by mouth daily     • multivitamin (THERAGRAN) TABS Take 1 tablet by mouth daily     • valsartan (DIOVAN) 40 mg tablet Take 40 mg by mouth daily     • Zn-Pyg Afri-Nettle-Saw Palmet (SAW PALMETTO COMPLEX PO) Take by mouth     • amoxicillin (AMOXIL) 500 mg capsule take 4 capsules by mouth ONCE 30-60 MINUTES prior to dental appointment (Patient not taking: No sig reported)       No current facility-administered medications for this visit.        Allergies   Allergen Reactions   • Celecoxib GI Intolerance   • Dabigatran GI Intolerance   • Rivaroxaban GI  Intolerance   • Rofecoxib Other (See Comments)   • Sotalol      Other reaction(s): decreased blood pressure       Review of Systems   Constitutional:  Negative for chills and fever.   HENT:  Positive for sore throat. Negative for congestion.    Respiratory:  Positive for cough. Negative for shortness of breath.    Cardiovascular:  Negative for chest pain.   Neurological:  Positive for headaches.       Video Exam    There were no vitals filed for this visit.    Physical Exam  Constitutional:       General: He is not in acute distress.  Pulmonary:      Effort: Pulmonary effort is normal. No respiratory distress.   Neurological:      Mental Status: He is alert.          Visit Time  Total Visit Duration: 20

## 2024-01-02 NOTE — PATIENT INSTRUCTIONS
Problem List Items Addressed This Visit          Other    Sore throat - Primary     Pt requesting Zpack which he has had before.  Patient did a COVID test which was negative.  Discussed with patient further evaluation if not improving.  Patient can continue with warm salt water gargles.         Relevant Medications    azithromycin (Zithromax) 250 mg tablet

## 2024-01-02 NOTE — TELEPHONE ENCOUNTER
The patient call to request azithromycin (ZITHROMAX) 250 mg tablet    Please contact the patient when the order is ready

## 2024-01-22 DIAGNOSIS — K21.9 GASTROESOPHAGEAL REFLUX DISEASE, UNSPECIFIED WHETHER ESOPHAGITIS PRESENT: ICD-10-CM

## 2024-01-22 RX ORDER — LANSOPRAZOLE 30 MG/1
CAPSULE, DELAYED RELEASE ORAL
Qty: 90 CAPSULE | Refills: 1 | Status: SHIPPED | OUTPATIENT
Start: 2024-01-22

## 2024-01-29 LAB
ALBUMIN SERPL-MCNC: 4.1 G/DL (ref 3.6–5.1)
ALBUMIN/GLOB SERPL: 2.1 (CALC) (ref 1–2.5)
ALP SERPL-CCNC: 62 U/L (ref 35–144)
ALT SERPL-CCNC: 26 U/L (ref 9–46)
AST SERPL-CCNC: 19 U/L (ref 10–35)
BASOPHILS # BLD AUTO: 79 CELLS/UL (ref 0–200)
BASOPHILS NFR BLD AUTO: 1.2 %
BILIRUB SERPL-MCNC: 0.9 MG/DL (ref 0.2–1.2)
BUN SERPL-MCNC: 33 MG/DL (ref 7–25)
BUN/CREAT SERPL: 29 (CALC) (ref 6–22)
CALCIUM SERPL-MCNC: 9.5 MG/DL (ref 8.6–10.3)
CHLORIDE SERPL-SCNC: 106 MMOL/L (ref 98–110)
CHOLEST SERPL-MCNC: 197 MG/DL
CHOLEST/HDLC SERPL: 4 (CALC)
CO2 SERPL-SCNC: 29 MMOL/L (ref 20–32)
CREAT SERPL-MCNC: 1.12 MG/DL (ref 0.7–1.22)
EOSINOPHIL # BLD AUTO: 284 CELLS/UL (ref 15–500)
EOSINOPHIL NFR BLD AUTO: 4.3 %
ERYTHROCYTE [DISTWIDTH] IN BLOOD BY AUTOMATED COUNT: 13.5 % (ref 11–15)
GFR/BSA.PRED SERPLBLD CYS-BASED-ARV: 65 ML/MIN/1.73M2
GLOBULIN SER CALC-MCNC: 2 G/DL (CALC) (ref 1.9–3.7)
GLUCOSE SERPL-MCNC: 97 MG/DL (ref 65–99)
HCT VFR BLD AUTO: 47.9 % (ref 38.5–50)
HDLC SERPL-MCNC: 49 MG/DL
HGB BLD-MCNC: 15.9 G/DL (ref 13.2–17.1)
LDLC SERPL CALC-MCNC: 123 MG/DL (CALC)
LYMPHOCYTES # BLD AUTO: 1954 CELLS/UL (ref 850–3900)
LYMPHOCYTES NFR BLD AUTO: 29.6 %
MCH RBC QN AUTO: 30.5 PG (ref 27–33)
MCHC RBC AUTO-ENTMCNC: 33.2 G/DL (ref 32–36)
MCV RBC AUTO: 91.9 FL (ref 80–100)
MONOCYTES # BLD AUTO: 515 CELLS/UL (ref 200–950)
MONOCYTES NFR BLD AUTO: 7.8 %
NEUTROPHILS # BLD AUTO: 3769 CELLS/UL (ref 1500–7800)
NEUTROPHILS NFR BLD AUTO: 57.1 %
NONHDLC SERPL-MCNC: 148 MG/DL (CALC)
PLATELET # BLD AUTO: 206 THOUSAND/UL (ref 140–400)
PMV BLD REES-ECKER: 11.6 FL (ref 7.5–12.5)
POTASSIUM SERPL-SCNC: 4.2 MMOL/L (ref 3.5–5.3)
PROT SERPL-MCNC: 6.1 G/DL (ref 6.1–8.1)
RBC # BLD AUTO: 5.21 MILLION/UL (ref 4.2–5.8)
SODIUM SERPL-SCNC: 141 MMOL/L (ref 135–146)
TRIGL SERPL-MCNC: 133 MG/DL
WBC # BLD AUTO: 6.6 THOUSAND/UL (ref 3.8–10.8)

## 2024-02-01 ENCOUNTER — OFFICE VISIT (OUTPATIENT)
Dept: INTERNAL MEDICINE CLINIC | Facility: CLINIC | Age: 84
End: 2024-02-01
Payer: COMMERCIAL

## 2024-02-01 VITALS
HEIGHT: 69 IN | DIASTOLIC BLOOD PRESSURE: 76 MMHG | BODY MASS INDEX: 27.11 KG/M2 | WEIGHT: 183 LBS | SYSTOLIC BLOOD PRESSURE: 136 MMHG

## 2024-02-01 DIAGNOSIS — N18.31 STAGE 3A CHRONIC KIDNEY DISEASE (HCC): ICD-10-CM

## 2024-02-01 DIAGNOSIS — E78.2 MIXED HYPERLIPIDEMIA: ICD-10-CM

## 2024-02-01 DIAGNOSIS — I48.0 PAROXYSMAL A-FIB (HCC): ICD-10-CM

## 2024-02-01 DIAGNOSIS — I10 HYPERTENSION, ESSENTIAL, BENIGN: Primary | ICD-10-CM

## 2024-02-01 PROBLEM — J02.9 SORE THROAT: Status: RESOLVED | Noted: 2024-01-02 | Resolved: 2024-02-01

## 2024-02-01 PROBLEM — I42.0 DILATED CARDIOMYOPATHY (HCC): Status: RESOLVED | Noted: 2020-04-07 | Resolved: 2024-02-01

## 2024-02-01 PROCEDURE — 99214 OFFICE O/P EST MOD 30 MIN: CPT | Performed by: INTERNAL MEDICINE

## 2024-02-01 RX ORDER — METOPROLOL SUCCINATE 25 MG/1
25 TABLET, EXTENDED RELEASE ORAL DAILY
COMMUNITY
Start: 2024-01-15 | End: 2024-10-11

## 2024-02-01 NOTE — PROGRESS NOTES
Name: Rashawn Jackson Jr.      : 1940      MRN: [id number]  Encounter Provider: Lea Reyes, MD  Encounter Date: 2024   Enc[facility] department: [facility]    Assessment & Plan     1. Hypertension, essential, benign  -     CBC and differential; Future; Expected date: 2024  -     Comprehensive metabolic panel; Future; Expected date: 2024    2. Paroxysmal A-fib (HCC)  Assessment & Plan:  Cardioversion in October  He f/u with cardiology and is on amiodarone, Eliquis, metoprolol    Orders:  -     CBC and differential; Future; Expected date: 2024  -     Comprehensive metabolic panel; Future; Expected date: 2024  -     TSH, 3rd generation with Free T4 reflex; Future; Expected date: 2024    3. Mixed hyperlipidemia  -     CBC and differential; Future; Expected date: 2024  -     Comprehensive metabolic panel; Future; Expected date: 2024  -     Lipid panel; Future; Expected date: 2024    4. Stage 3a chronic kidney disease (HCC)  Assessment & Plan:  Lab Results   Component Value Date    EGFR 65 2024    EGFR 59 (L) 10/11/2023    EGFR 63 2023    CREATININE 1.12 2024    CREATININE 1.21 10/11/2023    CREATININE 1.15 2023   Stable and continue to monitor      Handicap parking placard completed today       Subjective      Right knee pain suleman when he walks, s/p replacement in   Takes Tylenol PRN  Requesting handicap parking placard  Recent labs reviewed and stable      Review of Systems   Constitutional:  Negative for unexpected weight change.   Respiratory:  Negative for shortness of breath.    Cardiovascular:  Negative for chest pain, palpitations and leg swelling.   Gastrointestinal:  Negative for abdominal pain, constipation and diarrhea.   Musculoskeletal:  Positive for arthralgias (right knee pain).   Neurological:  Negative for dizziness and headaches.       Current Outpatient Medications on File Prior to Visit  "  Medication Sig   • amiodarone 200 mg tablet Take 200 mg by mouth daily   • apixaban (ELIQUIS) 5 mg Take 5 mg by mouth 2 (two) times a day   • Apoaequorin (Prevagen) 10 MG CAPS Take by mouth   • Calcium Carbonate-Vitamin D (CALTRATE 600+D PO) Take 1 tablet by mouth daily   • Diclofenac Sodium (VOLTAREN) 1 % apply 2 grams to affected area four times a day AS NEEDED FOR PAIN   • lansoprazole (PREVACID) 30 mg capsule take 1 capsule by mouth once daily   • metoprolol succinate (TOPROL-XL) 25 mg 24 hr tablet Take 25 mg by mouth daily   • Multiple Vitamins-Minerals (OCUVITE PO) Take 1 tablet by mouth daily   • multivitamin (THERAGRAN) TABS Take 1 tablet by mouth daily   • valsartan (DIOVAN) 40 mg tablet Take 40 mg by mouth daily   • Zn-Pyg Afri-Nettle-Saw Palmet (SAW PALMETTO COMPLEX PO) Take by mouth   • amoxicillin (AMOXIL) 500 mg capsule take 4 capsules by mouth ONCE 30-60 MINUTES prior to dental appointment (Patient not taking: No sig reported)       Objective     /76 (BP Location: Left arm, Patient Position: Sitting, Cuff Size: Standard)   Ht 5' 9\" (1.753 m)   Wt 83 kg (183 lb)   BMI 27.02 kg/m²     Physical Exam  Constitutional:       Appearance: He is well-developed. He is not ill-appearing.   Eyes:      Conjunctiva/sclera: Conjunctivae normal.   Cardiovascular:      Rate and Rhythm: Normal rate and regular rhythm.      Heart sounds: Normal heart sounds. No murmur heard.  Pulmonary:      Effort: Pulmonary effort is normal. No respiratory distress.      Breath sounds: Normal breath sounds. No wheezing or rales.   Abdominal:      General: There is no distension.      Palpations: Abdomen is soft. There is no mass.      Tenderness: There is no abdominal tenderness. There is no guarding or rebound.   Musculoskeletal:      Cervical back: Neck supple.      Right knee: Swelling present. Normal range of motion.      Left knee: No swelling.      Right lower leg: No edema.      Left lower leg: No edema. "   Neurological:      Mental Status: He is alert and oriented to person, place, and time.      Gait: Gait abnormal.   Psychiatric:         Mood and Affect: Mood normal.         Behavior: Behavior normal.         Thought Content: Thought content normal.         Judgment: Judgment normal.       Lea Reyes, MD

## 2024-04-17 DIAGNOSIS — I34.0 NONRHEUMATIC MITRAL VALVE REGURGITATION: Primary | ICD-10-CM

## 2024-04-17 NOTE — TELEPHONE ENCOUNTER
Pt was calling in to get a refill on the Amoxicillin (Amoxil) 500 mg. Pt stated he has a dentist appt on Monday 4/22 and does not have anymore left of this prescription. Pt would like this prescription sent to the [facility] on [address] [id number], please advise if needed thank you.

## 2024-04-18 RX ORDER — AMOXICILLIN 500 MG/1
2000 CAPSULE ORAL ONCE
Qty: 4 CAPSULE | Refills: 3 | Status: SHIPPED | OUTPATIENT
Start: 2024-04-18 | End: 2024-04-18

## 2024-06-11 ENCOUNTER — TELEPHONE (OUTPATIENT)
Age: 84
End: 2024-06-11

## 2024-06-11 DIAGNOSIS — S82.832A CLOSED FRACTURE OF DISTAL END OF LEFT FIBULA, UNSPECIFIED FRACTURE MORPHOLOGY, INITIAL ENCOUNTER: Primary | ICD-10-CM

## 2024-06-11 DIAGNOSIS — S30.0XXA TRAUMATIC HEMATOMA OF LOWER BACK, INITIAL ENCOUNTER: ICD-10-CM

## 2024-06-11 RX ORDER — TRAMADOL HYDROCHLORIDE 50 MG/1
50 TABLET ORAL EVERY 12 HOURS PRN
Qty: 30 TABLET | Refills: 0 | Status: SHIPPED | OUTPATIENT
Start: 2024-06-11

## 2024-06-11 NOTE — TELEPHONE ENCOUNTER
Spoke to him and his niece Ajay. Fell and was evaluated in the ER yesterday. Suspected left distal fibula fracture and lumbar contusion. No relief from acetaminophen 650mg and methocarbamol. Cannot take NSAIDs due to Eliquis. I will have him try tramadol. Discussed side effects of sedation, constipation with him and his niece. F/U with ortho in 2 days as scheduled.

## 2024-06-11 NOTE — TELEPHONE ENCOUNTER
Pt called was seen in ER yesterday after he feel Thursday and fractured his LT ankle. Pt states he was given a muscle relaxer (methocarbamol (ROBAXIN) 500 mg tablet) for pain and it's not helping at all. States the pain is a 10 on a pain scale.    Pt wants to know if Dr Reyes can prescribe something for the pain.     Pharmacy Verified: [facility]    Please advise

## 2024-06-26 ENCOUNTER — ESTABLISHED COMPREHENSIVE EXAM (OUTPATIENT)
Dept: URBAN - METROPOLITAN AREA CLINIC 6 | Facility: CLINIC | Age: 84
End: 2024-06-26

## 2024-06-26 DIAGNOSIS — H52.4: ICD-10-CM

## 2024-06-26 DIAGNOSIS — H18.893: ICD-10-CM

## 2024-06-26 DIAGNOSIS — H33.012: ICD-10-CM

## 2024-06-26 DIAGNOSIS — H35.411: ICD-10-CM

## 2024-06-26 DIAGNOSIS — D31.32: ICD-10-CM

## 2024-06-26 PROCEDURE — 92014 COMPRE OPH EXAM EST PT 1/>: CPT

## 2024-06-26 PROCEDURE — 92015 DETERMINE REFRACTIVE STATE: CPT

## 2024-06-26 ASSESSMENT — VISUAL ACUITY
OD_CC: 20/30-1
OS_CC: 20/50
OS_PH: 20/50
OU_CC: J2

## 2024-06-26 ASSESSMENT — TONOMETRY
OD_IOP_MMHG: 11
OS_IOP_MMHG: 10

## 2024-07-23 DIAGNOSIS — K21.9 GASTROESOPHAGEAL REFLUX DISEASE, UNSPECIFIED WHETHER ESOPHAGITIS PRESENT: ICD-10-CM

## 2024-07-23 RX ORDER — LANSOPRAZOLE 30 MG/1
CAPSULE, DELAYED RELEASE ORAL
Qty: 100 CAPSULE | Refills: 1 | Status: SHIPPED | OUTPATIENT
Start: 2024-07-23

## 2024-08-07 DIAGNOSIS — Z12.5 SCREENING PSA (PROSTATE SPECIFIC ANTIGEN): Primary | ICD-10-CM

## 2024-08-08 ENCOUNTER — TELEPHONE (OUTPATIENT)
Age: 84
End: 2024-08-08

## 2024-08-08 NOTE — TELEPHONE ENCOUNTER
Patient called stating he has his labs drawn at [facility] in [place] . He is asking to have his lab sleep from his labs ordered back in February to be reordered on a [facility] lab sheet.  If those and his recent PSA order can be faxed to [facility]: [phone]

## 2024-08-09 LAB
ALBUMIN SERPL-MCNC: 4.1 G/DL (ref 3.6–5.1)
ALBUMIN/GLOB SERPL: 2.3 (CALC) (ref 1–2.5)
ALP SERPL-CCNC: 68 U/L (ref 35–144)
ALT SERPL-CCNC: 25 U/L (ref 9–46)
AST SERPL-CCNC: 22 U/L (ref 10–35)
BASOPHILS # BLD AUTO: 120 CELLS/UL (ref 0–200)
BASOPHILS NFR BLD AUTO: 1.9 %
BILIRUB SERPL-MCNC: 1 MG/DL (ref 0.2–1.2)
BUN SERPL-MCNC: 16 MG/DL (ref 7–25)
BUN/CREAT SERPL: ABNORMAL (CALC) (ref 6–22)
CALCIUM SERPL-MCNC: 9.6 MG/DL (ref 8.6–10.3)
CHLORIDE SERPL-SCNC: 103 MMOL/L (ref 98–110)
CHOLEST SERPL-MCNC: 179 MG/DL
CHOLEST/HDLC SERPL: 4.5 (CALC)
CO2 SERPL-SCNC: 28 MMOL/L (ref 20–32)
CREAT SERPL-MCNC: 1.05 MG/DL (ref 0.7–1.22)
EOSINOPHIL # BLD AUTO: 674 CELLS/UL (ref 15–500)
EOSINOPHIL NFR BLD AUTO: 10.7 %
ERYTHROCYTE [DISTWIDTH] IN BLOOD BY AUTOMATED COUNT: 13.4 % (ref 11–15)
GFR/BSA.PRED SERPLBLD CYS-BASED-ARV: 70 ML/MIN/1.73M2
GLOBULIN SER CALC-MCNC: 1.8 G/DL (CALC) (ref 1.9–3.7)
GLUCOSE SERPL-MCNC: 98 MG/DL (ref 65–99)
HCT VFR BLD AUTO: 47.3 % (ref 38.5–50)
HDLC SERPL-MCNC: 40 MG/DL
HGB BLD-MCNC: 15.8 G/DL (ref 13.2–17.1)
LDLC SERPL CALC-MCNC: 117 MG/DL (CALC)
LYMPHOCYTES # BLD AUTO: 1758 CELLS/UL (ref 850–3900)
LYMPHOCYTES NFR BLD AUTO: 27.9 %
MCH RBC QN AUTO: 31 PG (ref 27–33)
MCHC RBC AUTO-ENTMCNC: 33.4 G/DL (ref 32–36)
MCV RBC AUTO: 92.9 FL (ref 80–100)
MONOCYTES # BLD AUTO: 561 CELLS/UL (ref 200–950)
MONOCYTES NFR BLD AUTO: 8.9 %
NEUTROPHILS # BLD AUTO: 3188 CELLS/UL (ref 1500–7800)
NEUTROPHILS NFR BLD AUTO: 50.6 %
NONHDLC SERPL-MCNC: 139 MG/DL (CALC)
PLATELET # BLD AUTO: 215 THOUSAND/UL (ref 140–400)
PMV BLD REES-ECKER: 11.9 FL (ref 7.5–12.5)
POTASSIUM SERPL-SCNC: 4.4 MMOL/L (ref 3.5–5.3)
PROT SERPL-MCNC: 5.9 G/DL (ref 6.1–8.1)
PSA SERPL-MCNC: 0.76 NG/ML
RBC # BLD AUTO: 5.09 MILLION/UL (ref 4.2–5.8)
SODIUM SERPL-SCNC: 138 MMOL/L (ref 135–146)
TRIGL SERPL-MCNC: 114 MG/DL
TSH SERPL-ACNC: 1.52 MIU/L (ref 0.4–4.5)
WBC # BLD AUTO: 6.3 THOUSAND/UL (ref 3.8–10.8)

## 2024-08-12 ENCOUNTER — RA CDI HCC (OUTPATIENT)
Dept: OTHER | Facility: HOSPITAL | Age: 84
End: 2024-08-12

## 2024-08-13 RX ORDER — METHOCARBAMOL 500 MG/1
500 TABLET, FILM COATED ORAL 4 TIMES DAILY
COMMUNITY
Start: 2024-06-10 | End: 2024-08-16

## 2024-08-16 ENCOUNTER — OFFICE VISIT (OUTPATIENT)
Dept: INTERNAL MEDICINE CLINIC | Facility: CLINIC | Age: 84
End: 2024-08-16
Payer: COMMERCIAL

## 2024-08-16 VITALS
BODY MASS INDEX: 26.01 KG/M2 | DIASTOLIC BLOOD PRESSURE: 74 MMHG | HEIGHT: 69 IN | OXYGEN SATURATION: 98 % | SYSTOLIC BLOOD PRESSURE: 120 MMHG | HEART RATE: 62 BPM | WEIGHT: 175.6 LBS

## 2024-08-16 DIAGNOSIS — I48.0 PAROXYSMAL A-FIB (HCC): ICD-10-CM

## 2024-08-16 DIAGNOSIS — Z00.00 MEDICARE ANNUAL WELLNESS VISIT, SUBSEQUENT: Primary | ICD-10-CM

## 2024-08-16 DIAGNOSIS — I10 HYPERTENSION, ESSENTIAL, BENIGN: ICD-10-CM

## 2024-08-16 DIAGNOSIS — E78.2 MIXED HYPERLIPIDEMIA: ICD-10-CM

## 2024-08-16 PROCEDURE — G0439 PPPS, SUBSEQ VISIT: HCPCS | Performed by: INTERNAL MEDICINE

## 2024-08-16 NOTE — PATIENT INSTRUCTIONS
Medicare Preventive Visit Patient Instructions  Thank you for completing your Welcome to Medicare Visit or Medicare Annual Wellness Visit today. Your next wellness visit will be due in one year (8/17/2025).  The screening/preventive services that you may require over the next 5-10 years are detailed below. Some tests may not apply to you based off risk factors and/or age. Screening tests ordered at today's visit but not completed yet may show as past due. Also, please note that scanned in results may not display below.  Preventive Screenings:  Service Recommendations Previous Testing/Comments   Colorectal Cancer Screening  Colonoscopy    Fecal Occult Blood Test (FOBT)/Fecal Immunochemical Test (FIT)  Fecal DNA/Cologuard Test  Flexible Sigmoidoscopy Age: 45-75 years old   Colonoscopy: every 10 years (May be performed more frequently if at higher risk)  OR  FOBT/FIT: every 1 year  OR  Cologuard: every 3 years  OR  Sigmoidoscopy: every 5 years  Screening may be recommended earlier than age 45 if at higher risk for colorectal cancer. Also, an individualized decision between you and your healthcare provider will decide whether screening between the ages of 76-85 would be appropriate. Colonoscopy: 01/31/2012  FOBT/FIT: Not on file  Cologuard: Not on file  Sigmoidoscopy: Not on file          Prostate Cancer Screening Individualized decision between patient and health care provider in men between ages of 55-69   Medicare will cover every 12 months beginning on the day after your 50th birthday PSA: 0.76 ng/mL           Hepatitis C Screening Once for adults born between 1945 and 1965  More frequently in patients at high risk for Hepatitis C Hep C Antibody: Not on file        Diabetes Screening 1-2 times per year if you're at risk for diabetes or have pre-diabetes Fasting glucose: No results in last 5 years (No results in last 5 years)  A1C: 5.4 % of total Hgb (1/16/2023)      Cholesterol Screening Once every 5 years if you  don't have a lipid disorder. May order more often based on risk factors. Lipid panel: 08/09/2024         Other Preventive Screenings Covered by Medicare:  Abdominal Aortic Aneurysm (AAA) Screening: covered once if your at risk. You're considered to be at risk if you have a family history of AAA or a male between the age of 65-75 who smoking at least 100 cigarettes in your lifetime.  Lung Cancer Screening: covers low dose CT scan once per year if you meet all of the following conditions: (1) Age 55-77; (2) No signs or symptoms of lung cancer; (3) Current smoker or have quit smoking within the last 15 years; (4) You have a tobacco smoking history of at least 20 pack years (packs per day x number of years you smoked); (5) You get a written order from a healthcare provider.  Glaucoma Screening: covered annually if you're considered high risk: (1) You have diabetes OR (2) Family history of glaucoma OR (3)  aged 50 and older OR (4)  American aged 65 and older  Osteoporosis Screening: covered every 2 years if you meet one of the following conditions: (1) Have a vertebral abnormality; (2) On glucocorticoid therapy for more than 3 months; (3) Have primary hyperparathyroidism; (4) On osteoporosis medications and need to assess response to drug therapy.  HIV Screening: covered annually if you're between the age of 15-65. Also covered annually if you are younger than 15 and older than 65 with risk factors for HIV infection. For pregnant patients, it is covered up to 3 times per pregnancy.    Immunizations:  Immunization Recommendations   Influenza Vaccine Annual influenza vaccination during flu season is recommended for all persons aged >= 6 months who do not have contraindications   Pneumococcal Vaccine   * Pneumococcal conjugate vaccine = PCV13 (Prevnar 13), PCV15 (Vaxneuvance), PCV20 (Prevnar 20)  * Pneumococcal polysaccharide vaccine = PPSV23 (Pneumovax) Adults 19-63 yo with certain risk factors or  if 65+ yo  If never received any pneumonia vaccine: recommend Prevnar 20 (PCV20)  Give PCV20 if previously received 1 dose of PCV13 or PPSV23   Hepatitis B Vaccine 3 dose series if at intermediate or high risk (ex: diabetes, end stage renal disease, liver disease)   Respiratory syncytial virus (RSV) Vaccine - COVERED BY MEDICARE PART D  * RSVPreF3 (Arexvy) CDC recommends that adults 60 years of age and older may receive a single dose of RSV vaccine using shared clinical decision-making (SCDM)   Tetanus (Td) Vaccine - COST NOT COVERED BY MEDICARE PART B Following completion of primary series, a booster dose should be given every 10 years to maintain immunity against tetanus. Td may also be given as tetanus wound prophylaxis.   Tdap Vaccine - COST NOT COVERED BY MEDICARE PART B Recommended at least once for all adults. For pregnant patients, recommended with each pregnancy.   Shingles Vaccine (Shingrix) - COST NOT COVERED BY MEDICARE PART B  2 shot series recommended in those 19 years and older who have or will have weakened immune systems or those 50 years and older     Health Maintenance Due:      Topic Date Due   • Colorectal Cancer Screening  01/31/2019     Immunizations Due:      Topic Date Due   • Influenza Vaccine (1) 09/01/2024     Advance Directives   What are advance directives?  Advance directives are legal documents that state your wishes and plans for medical care. These plans are made ahead of time in case you lose your ability to make decisions for yourself. Advance directives can apply to any medical decision, such as the treatments you want, and if you want to donate organs.   What are the types of advance directives?  There are many types of advance directives, and each state has rules about how to use them. You may choose a combination of any of the following:  Living will:  This is a written record of the treatment you want. You can also choose which treatments you do not want, which to limit, and  which to stop at a certain time. This includes surgery, medicine, IV fluid, and tube feedings.   Durable power of  for healthcare (DPAHC):  This is a written record that states who you want to make healthcare choices for you when you are unable to make them for yourself. This person, called a proxy, is usually a family member or a friend. You may choose more than 1 proxy.  Do not resuscitate (DNR) order:  A DNR order is used in case your heart stops beating or you stop breathing. It is a request not to have certain forms of treatment, such as CPR. A DNR order may be included in other types of advance directives.  Medical directive:  This covers the care that you want if you are in a coma, near death, or unable to make decisions for yourself. You can list the treatments you want for each condition. Treatment may include pain medicine, surgery, blood transfusions, dialysis, IV or tube feedings, and a ventilator (breathing machine).  Values history:  This document has questions about your views, beliefs, and how you feel and think about life. This information can help others choose the care that you would choose.  Why are advance directives important?  An advance directive helps you control your care. Although spoken wishes may be used, it is better to have your wishes written down. Spoken wishes can be misunderstood, or not followed. Treatments may be given even if you do not want them. An advance directive may make it easier for your family to make difficult choices about your care.   Weight Management   Why it is important to manage your weight:  Being overweight increases your risk of health conditions such as heart disease, high blood pressure, type 2 diabetes, and certain types of cancer. It can also increase your risk for osteoarthritis, sleep apnea, and other respiratory problems. Aim for a slow, steady weight loss. Even a small amount of weight loss can lower your risk of health problems.  How to lose  weight safely:  A safe and healthy way to lose weight is to eat fewer calories and get regular exercise. You can lose up about 1 pound a week by decreasing the number of calories you eat by 500 calories each day.   Healthy meal plan for weight management:  A healthy meal plan includes a variety of foods, contains fewer calories, and helps you stay healthy. A healthy meal plan includes the following:  Eat whole-grain foods more often.  A healthy meal plan should contain fiber. Fiber is the part of grains, fruits, and vegetables that is not broken down by your body. Whole-grain foods are healthy and provide extra fiber in your diet. Some examples of whole-grain foods are whole-wheat breads and pastas, oatmeal, brown rice, and bulgur.  Eat a variety of vegetables every day.  Include dark, leafy greens such as spinach, kale, idania greens, and mustard greens. Eat yellow and orange vegetables such as carrots, sweet potatoes, and winter squash.   Eat a variety of fruits every day.  Choose fresh or canned fruit (canned in its own juice or light syrup) instead of juice. Fruit juice has very little or no fiber.  Eat low-fat dairy foods.  Drink fat-free (skim) milk or 1% milk. Eat fat-free yogurt and low-fat cottage cheese. Try low-fat cheeses such as mozzarella and other reduced-fat cheeses.  Choose meat and other protein foods that are low in fat.  Choose beans or other legumes such as split peas or lentils. Choose fish, skinless poultry (chicken or turkey), or lean cuts of red meat (beef or pork). Before you cook meat or poultry, cut off any visible fat.   Use less fat and oil.  Try baking foods instead of frying them. Add less fat, such as margarine, sour cream, regular salad dressing and mayonnaise to foods. Eat fewer high-fat foods. Some examples of high-fat foods include french fries, doughnuts, ice cream, and cakes.  Eat fewer sweets.  Limit foods and drinks that are high in sugar. This includes candy, cookies,  regular soda, and sweetened drinks.  Exercise:  Exercise at least 30 minutes per day on most days of the week. Some examples of exercise include walking, biking, dancing, and swimming. You can also fit in more physical activity by taking the stairs instead of the elevator or parking farther away from stores. Ask your healthcare provider about the best exercise plan for you.      © Copyright [employer] 2018 Information is for End User's use only and may not be sold, redistributed or otherwise used for commercial purposes. All illustrations and images included in CareNotes® are the copyrighted property of [employer]., Inc[employer] or [employer]

## 2024-08-16 NOTE — PROGRESS NOTES
Ambulatory Visit  Name: Rashawn Jackson Jr.      : 1940      MRN: [id number]  Encounter Provider: Lea Reyes, MD  Encounter Date: 2024   Encounter department: [facility]    Assessment & Plan   1. Medicare annual wellness visit, subsequent  2. Hypertension, essential, benign  -     CBC and differential; Future; Expected date: 2025  -     Comprehensive metabolic panel; Future; Expected date: 2025  -     Lipid panel; Future; Expected date: 2025  -     CBC and differential  -     Comprehensive metabolic panel  -     Lipid panel  3. Paroxysmal A-fib (HCC)  -     CBC and differential; Future; Expected date: 2025  -     Comprehensive metabolic panel; Future; Expected date: 2025  -     Lipid panel; Future; Expected date: 2025  -     CBC and differential  -     Comprehensive metabolic panel  -     Lipid panel  4. Mixed hyperlipidemia  -     CBC and differential; Future; Expected date: 2025  -     Comprehensive metabolic panel; Future; Expected date: 2025  -     Lipid panel; Future; Expected date: 2025  -     CBC and differential  -     Comprehensive metabolic panel  -     Lipid panel     Preventive health issues were discussed with patient, and age appropriate screening tests were ordered as noted in patient's After Visit Summary. Personalized health advice and appropriate referrals for health education or preventive services given if needed, as noted in patient's After Visit Summary.    History of Present Illness     HPI   Patient Care Team:  Lea Reyes, MD as PCP - General (Internal Medicine)  Lea Reyes, MD as PCP - PCP-[facility] ([facility])  Jaxon Willett MD    Review of Systems  Medical History Reviewed by provider this encounter:  Tobacco  Allergies  Meds  Problems  Med Hx  Surg Hx  Fam Hx       Annual Wellness Visit Questionnaire   Rashawn is here for his Subsequent Wellness visit.     Health Risk Assessment:   Patient  rates overall health as very good. Patient feels that their physical health rating is same. Patient is satisfied with their life. Eyesight was rated as same. Hearing was rated as same. Patient feels that their emotional and mental health rating is same. Patients states they are never, rarely angry. Patient states they are never, rarely unusually tired/fatigued. Pain experienced in the last 7 days has been none. Patient states that he has experienced no weight loss or gain in last 6 months.     Depression Screening:   PHQ-2 Score: 0      Fall Risk Screening:   In the past year, patient has experienced: no history of falling in past year      Home Safety:  Patient does not have trouble with stairs inside or outside of their home. Patient has working smoke alarms and has working carbon monoxide detector. Home safety hazards include: none.     Nutrition:   Current diet is Regular.     Medications:   Patient is currently taking over-the-counter supplements. OTC medications include: see medication list. Patient is able to manage medications.     Activities of Daily Living (ADLs)/Instrumental Activities of Daily Living (IADLs):   Walk and transfer into and out of bed and chair?: Yes  Dress and groom yourself?: Yes    Bathe or shower yourself?: Yes    Feed yourself? Yes  Do your laundry/housekeeping?: Yes  Manage your money, pay your bills and track your expenses?: Yes  Make your own meals?: Yes    Do your own shopping?: Yes    Previous Hospitalizations:   Any hospitalizations or ED visits within the last 12 months?: Yes    How many hospitalizations have you had in the last year?: 1-2    Hospitalization Comments: April 5th for catheric ablasion     Advance Care Planning:   Living will: Yes    Durable POA for healthcare: Yes    Advanced directive: Yes      Cognitive Screening:   Provider or family/friend/caregiver concerned regarding cognition?: No    PREVENTIVE SCREENINGS      Cardiovascular Screening:    General: Screening  "Not Indicated and History Lipid Disorder      Diabetes Screening:     General: Screening Current      Colorectal Cancer Screening:     General: Screening Not Indicated      Prostate Cancer Screening:    General: Screening Not Indicated      Abdominal Aortic Aneurysm (AAA) Screening:        General: Screening Not Indicated      Lung Cancer Screening:     General: Screening Not Indicated      Hepatitis C Screening:    General: Screening Not Indicated    Screening, Brief Intervention, and Referral to Treatment (SBIRT)    Screening      Single Item Drug Screening:  How often have you used an illegal drug (including marijuana) or a prescription medication for non-medical reasons in the past year? never    Single Item Drug Screen Score: 0  Interpretation: Negative screen for possible drug use disorder    Social Determinants of Health     Financial Resource Strain: Low Risk  (4/5/2024)    Received from [facility]    Overall Financial Resource Strain (CARDIA)    • Difficulty of Paying Living Expenses: Not hard at all   Food Insecurity: No Food Insecurity (8/16/2024)    Hunger Vital Sign    • Worried About Running Out of Food in the Last Year: Never true    • Ran Out of Food in the Last Year: Never true   Transportation Needs: No Transportation Needs (8/16/2024)    PRAPARE - Transportation    • Lack of Transportation (Medical): No    • Lack of Transportation (Non-Medical): No   Housing Stability: Low Risk  (8/16/2024)    Housing Stability Vital Sign    • Unable to Pay for Housing in the Last Year: No    • Number of Times Moved in the Last Year: 1    • Homeless in the Last Year: No   Utilities: Not At Risk (8/16/2024)    [facility] Utilities    • Threatened with loss of utilities: No     No results found.    Objective     /74 (BP Location: Left arm, Patient Position: Sitting, Cuff Size: Standard)   Pulse 62   Ht 5' 9\" (1.753 m)   Wt 79.7 kg (175 lb 9.6 oz)   SpO2 98%   BMI 25.93 kg/m²     Physical " Exam  Vitals and nursing note reviewed.   Constitutional:       General: He is not in acute distress.     Appearance: He is well-developed.   Eyes:      Conjunctiva/sclera: Conjunctivae normal.   Cardiovascular:      Rate and Rhythm: Normal rate and regular rhythm.      Heart sounds: No murmur heard.  Pulmonary:      Effort: Pulmonary effort is normal. No respiratory distress.      Breath sounds: Normal breath sounds.   Abdominal:      Palpations: Abdomen is soft.      Tenderness: There is no abdominal tenderness.   Musculoskeletal:      Cervical back: Neck supple.      Right lower leg: Edema (trace) present.      Left lower leg: Edema (trace) present.   Skin:     Findings: Erythema (shins) present.   Neurological:      Mental Status: He is alert.   Psychiatric:         Mood and Affect: Mood normal.         Behavior: Behavior normal.

## 2024-09-17 ENCOUNTER — TELEPHONE (OUTPATIENT)
Age: 84
End: 2024-09-17

## 2024-09-17 NOTE — TELEPHONE ENCOUNTER
Patient called in and wanted to know if Dr. Reyes could prescribe something to help him sleep that would not interfere with the Amiodarone his Cardiologist prescribe to him if the doctor is able to prescribe something for him Please send to [facility] [address]

## 2024-09-17 NOTE — TELEPHONE ENCOUNTER
Is it trouble falling or staying asleep?When did it start?   Marly Bailon is a 60 y.o male that is present in the office for a medicare wellness and medication evaluation. 1. Have you been to the ER, urgent care clinic since your last visit? Hospitalized since your last visit? No    2. Have you seen or consulted any other health care providers outside of the [address] [place] [address] since your last visit? Include any pap smears or colon screening.  No    Health Maintenance Due   Topic Date Due    DTaP/Tdap/Td series (1 - Tdap) 03/14/1980    MEDICARE YEARLY EXAM  03/14/2018

## 2024-09-17 NOTE — TELEPHONE ENCOUNTER
Pt returned call. He said when he wakes up to go to the bathroom at night, when he goes back to bed he cannot fall asleep. He doesn't have trouble falling asleep when he first lies down. Pt said it started about a week ago. Previously, when he got up to go the the bathroom he did not have any trouble sleeping again. Pt is not aware of anything that has changed last week.

## 2024-09-18 DIAGNOSIS — F51.04 PSYCHOPHYSIOLOGICAL INSOMNIA: Primary | ICD-10-CM

## 2024-09-18 RX ORDER — DOXEPIN 3 MG/1
3 TABLET, FILM COATED ORAL
Qty: 30 TABLET | Refills: 0 | Status: SHIPPED | OUTPATIENT
Start: 2024-09-18

## 2024-09-18 NOTE — TELEPHONE ENCOUNTER
The patient called this morning he is checking the status on the medication to help him sleep   he would like [facility] to [facility]    please call him with an update thank you

## 2024-09-18 NOTE — TELEPHONE ENCOUNTER
See previous message. Add that if this just started, I recommend observation for now instead of starting the sleeping pill because he may end up being dependent on a pill

## 2024-09-18 NOTE — TELEPHONE ENCOUNTER
I will send a low dose of doxepin. Advise him that sleeping pills can make him groggy when he wakes up, even for hours later.

## 2024-09-19 ENCOUNTER — TELEPHONE (OUTPATIENT)
Dept: INTERNAL MEDICINE CLINIC | Facility: CLINIC | Age: 84
End: 2024-09-19

## 2024-09-21 NOTE — TELEPHONE ENCOUNTER
PA for Doxepin HCl 3 MG TABS SUBMITTED     via    [x]CMM-KEY: [id number]  []Surescripts-Case ID #   []Faxed to plan   []Other website   []Phone call Case ID #     Office notes sent, clinical questions answered. Awaiting determination    Turnaround time for your insurance to make a decision on your Prior Authorization can take 7-21 business days.

## 2024-10-28 ENCOUNTER — TELEPHONE (OUTPATIENT)
Age: 84
End: 2024-10-28

## 2024-10-28 NOTE — TELEPHONE ENCOUNTER
Pt. Wants to know if Dr. Reyes will call in a zpack for him. He said he is getting bronchitis and wants to knock it out asap because he goes to see the surgeon tomorrow and he is going to schedule him for a Pace Maker and he doesn't want to put it off.    Ty   Pt. Checked for Covid and he is negative

## 2024-10-29 NOTE — TELEPHONE ENCOUNTER
Patient called to see if there was an update on the medication he requested. I advised him of the last message and he said that he tested for Covid and its negative but he knows he has bronchitis because he gets it around this time every year. He wants to know if his provider will be sending the medication to the pharmacy or not. Please advise. Thank you.

## 2024-10-29 NOTE — TELEPHONE ENCOUNTER
No I am not sending the Z-Marty at this time.  His symptoms illness is likely viral and an antibiotic will not help.  If he sees no improvement after a week from start of symptoms, I will consider sending an antibiotic

## 2024-10-29 NOTE — TELEPHONE ENCOUNTER
Pt called to call back about this. His symptoms started yesterday. He said he always gets bronchitis at this time of year. His symptoms are ST, hoarseness, runny nose. Please, advise. Pt said to leave message if he does not answer.

## 2024-10-29 NOTE — TELEPHONE ENCOUNTER
When did symptoms start? Advise to test for COVID if he has not done so. Highly likely that this is a viral infection and an antibiotic will not help.

## 2025-01-22 ENCOUNTER — TELEPHONE (OUTPATIENT)
Age: 85
End: 2025-01-22

## 2025-02-07 DIAGNOSIS — K21.9 GASTROESOPHAGEAL REFLUX DISEASE, UNSPECIFIED WHETHER ESOPHAGITIS PRESENT: ICD-10-CM

## 2025-02-07 RX ORDER — LANSOPRAZOLE 30 MG/1
30 CAPSULE, DELAYED RELEASE ORAL DAILY
Qty: 100 CAPSULE | Refills: 1 | Status: SHIPPED | OUTPATIENT
Start: 2025-02-07
